# Patient Record
Sex: MALE | Race: WHITE | Employment: FULL TIME | URBAN - METROPOLITAN AREA
[De-identification: names, ages, dates, MRNs, and addresses within clinical notes are randomized per-mention and may not be internally consistent; named-entity substitution may affect disease eponyms.]

---

## 2017-08-13 DIAGNOSIS — R06.09 DYSPNEA ON EXERTION: Primary | ICD-10-CM

## 2017-08-13 RX ORDER — ALBUTEROL SULFATE 90 UG/1
1 AEROSOL, METERED RESPIRATORY (INHALATION)
Qty: 1 INHALER | Refills: 4 | Status: SHIPPED | OUTPATIENT
Start: 2017-08-13 | End: 2020-03-11

## 2018-01-12 ENCOUNTER — TELEPHONE (OUTPATIENT)
Dept: INTERNAL MEDICINE CLINIC | Age: 53
End: 2018-01-12

## 2018-01-12 RX ORDER — BENZONATATE 200 MG/1
200 CAPSULE ORAL
Qty: 20 CAP | Refills: 0 | Status: SHIPPED | OUTPATIENT
Start: 2018-01-12 | End: 2018-01-19

## 2018-01-12 NOTE — TELEPHONE ENCOUNTER
texting pt. Mostly dry / rattling cough without other symptoms following sinus infection. No postnasal drip. Failing dayquil, nyquil. Will try tessalon next. New medication or Refill was escribed to patient's pharmacy as requested.

## 2018-08-13 ENCOUNTER — OFFICE VISIT (OUTPATIENT)
Dept: INTERNAL MEDICINE CLINIC | Age: 53
End: 2018-08-13

## 2018-08-13 VITALS
BODY MASS INDEX: 21.55 KG/M2 | HEART RATE: 50 BPM | TEMPERATURE: 97.5 F | DIASTOLIC BLOOD PRESSURE: 74 MMHG | WEIGHT: 159.13 LBS | SYSTOLIC BLOOD PRESSURE: 116 MMHG | OXYGEN SATURATION: 100 % | RESPIRATION RATE: 17 BRPM | HEIGHT: 72 IN

## 2018-08-13 DIAGNOSIS — G47.33 OSA (OBSTRUCTIVE SLEEP APNEA): ICD-10-CM

## 2018-08-13 DIAGNOSIS — Z23 ENCOUNTER FOR IMMUNIZATION: ICD-10-CM

## 2018-08-13 DIAGNOSIS — D22.9 MULTIPLE BENIGN NEVI: ICD-10-CM

## 2018-08-13 DIAGNOSIS — Z12.11 COLON CANCER SCREENING: ICD-10-CM

## 2018-08-13 DIAGNOSIS — Z00.00 PREVENTATIVE HEALTH CARE: Primary | ICD-10-CM

## 2018-08-13 NOTE — MR AVS SNAPSHOT
[address] [place] [address]  [phone]     Patient: Oneyda Saha Lower  MRN: [id number]  :1965               Visit Information     Date & Time Provider Department Dept. Phone Encounter #    2018  7:45 AM Vanessa Min MD [facility] [id number]      Follow-up Instructions     Return in about 1 year (around 2019).       Upcoming Health Maintenance        Date Due    COLONOSCOPY 1984    DTaP/Tdap/Td series (1 - Tdap) 2011    Influenza Age 9 to Adult 2018      Allergies as of 2018  Review Complete On: 2018 By: Vanessa Min MD    No Known Allergies      Current Immunizations  Reviewed on 2018    Name Date    Td 2018, 2011       Reviewed by Vanessa Min MD on 2018 at  8:12 AM    Reviewed by Vanessa Min MD on 2018 at  8:12 AM    Reviewed by Vanessa Min MD on 2018 at  8:14 AM   You Were Diagnosed With        Codes Comments    Preventative health care    -  Primary ICD-10-CM: Z00.00  ICD-9-CM: V70.0     TABATHA (obstructive sleep apnea)     ICD-10-CM: P22.96  ICD-9-CM: 327.23     Encounter for immunization     ICD-10-CM: Z23  ICD-9-CM: V03.89     Colon cancer screening     ICD-10-CM: Z12.11  ICD-9-CM: V76.51     Multiple benign nevi     ICD-10-CM: D22.9  ICD-9-CM: 216.9       Vitals     BP Pulse Temp Resp Height(growth percentile) Weight(growth percentile)    116/74 (BP 1 Location: Left arm, BP Patient Position: Sitting) (!) 50 97.5 °F (36.4 °C) (Oral) 17 6' (1.829 m) 159 lb 2 oz (72.2 kg)    SpO2 BMI Smoking Status             100% 21.58 kg/m2 Never Smoker       Vitals History      BMI and BSA Data     Body Mass Index Body Surface Area    21.58 kg/m 2 1.92 m 2         Preferred Pharmacy       Pharmacy Name Phone    [id number] Shivani Nava, [address] [phone]         Your Updated Medication List          This list is accurate as of 18  8:24 AM.  Always use your most recent med list.                albuterol 90 mcg/actuation inhaler   Commonly known as:  PROVENTIL HFA, VENTOLIN HFA, PROAIR HFA   Take 1 Puff by inhalation every four (4) hours as needed for Wheezing. cetirizine 10 mg tablet   Commonly known as:  ZYRTEC   Take 1 tablet by mouth daily. varicella-zoster recombinant (PF) 50 mcg/0.5 mL Susr injection   Commonly known as:  SHINGRIX (PF)   0.5 mL by IntraMUSCular route once for 1 dose. Prescriptions Printed        Refills    varicella-zoster recombinant, PF, (SHINGRIX, PF,) 50 mcg/0.5 mL susr injection 0    Si.5 mL by IntraMUSCular route once for 1 dose. Class: Print    Route: IntraMUSCular      We Performed the Following     LIPID PANEL [82612 CPT(R)]     METABOLIC PANEL, BASIC [65595 CPT(R)]     PSA, DIAGNOSTIC (PROSTATE SPECIFIC AG) [91763 CPT(R)]     REFERRAL TO DERMATOLOGY [REF19 Custom]     REFERRAL TO GASTROENTEROLOGY [TWJ54 Custom]       Follow-up Instructions     Return in about 1 year (around 2019). Referral Information     Referral ID Referred By Referred To       [id number] [address], [address] [place] [address]         Visits Status Start Date End Date    1 New Request 18    If your referral has a status of pending review or denied, additional information will be sent to support the outcome of this decision. Referral ID Referred By Referred To    [id number] Renetta Pack NP      [facility], [address]      Phone: [phone]      Fax: [phone]    Visits Status Start Date End Date    1 New Request 18    If your referral has a status of pending review or denied, additional information will be sent to support the outcome of this decision.       Patient Instructions         Well Visit, Men 48 to 72: Care Instructions  Your Care Instructions    Physical exams can help you stay healthy. Your doctor has checked your overall health and may have suggested ways to take good care of yourself. He or she also may have recommended tests. At home, you can help prevent illness with healthy eating, regular exercise, and other steps. Follow-up care is a key part of your treatment and safety. Be sure to make and go to all appointments, and call your doctor if you are having problems. It's also a good idea to know your test results and keep a list of the medicines you take. How can you care for yourself at home? · Reach and stay at a healthy weight. This will lower your risk for many problems, such as obesity, diabetes, heart disease, and high blood pressure. · Get at least 30 minutes of exercise on most days of the week. Walking is a good choice. You also may want to do other activities, such as running, swimming, cycling, or playing tennis or team sports. · Do not smoke. Smoking can make health problems worse. If you need help quitting, talk to your doctor about stop-smoking programs and medicines. These can increase your chances of quitting for good. · Protect your skin from too much sun. When you're outdoors from 10 a.m. to 4 p.m., stay in the shade or cover up with clothing and a hat with a wide brim. Wear sunglasses that block UV rays. Even when it's cloudy, put broad-spectrum sunscreen (SPF 30 or higher) on any exposed skin. · See a dentist one or two times a year for checkups and to have your teeth cleaned. · Wear a seat belt in the car. · Limit alcohol to 2 drinks a day. Too much alcohol can cause health problems. Follow your doctor's advice about when to have certain tests. These tests can spot problems early. · Cholesterol.  Your doctor will tell you how often to have this done based on your overall health and other things that can increase your risk for heart attack and stroke. · Blood pressure. Have your blood pressure checked during a routine doctor visit. Your doctor will tell you how often to check your blood pressure based on your age, your blood pressure results, and other factors. · Prostate exam. Talk to your doctor about whether you should have a blood test (called a PSA test) for prostate cancer. Experts disagree on whether men should have this test. Some experts recommend that you discuss the benefits and risks of the test with your doctor. · Diabetes. Ask your doctor whether you should have tests for diabetes. · Vision. Some experts recommend that you have yearly exams for glaucoma and other age-related eye problems starting at age 48. · Hearing. Tell your doctor if you notice any change in your hearing. You can have tests to find out how well you hear. · Colon cancer. You should begin tests for colon cancer at age 48. You may have one of several tests. Your doctor will tell you how often to have tests based on your age and risk. Risks include whether you already had a precancerous polyp removed from your colon or whether your parent, brother, sister, or child has had colon cancer. · Heart attack and stroke risk. At least every 4 to 6 years, you should have your risk for heart attack and stroke assessed. Your doctor uses factors such as your age, blood pressure, cholesterol, and whether you smoke or have diabetes to show what your risk for a heart attack or stroke is over the next 10 years. · Abdominal aortic aneurysm. Ask your doctor whether you should have a test to check for an aneurysm. You may need a test if you ever smoked or if your parent, brother, sister, or child has had an aneurysm. When should you call for help? Watch closely for changes in your health, and be sure to contact your doctor if you have any problems or symptoms that concern you. Where can you learn more? Go to http://phylicia-maryana.info/.   Enter N829 in the search box to learn more about \"Well Visit, Men 48 to 72: Care Instructions. \"  Current as of: Evi 10, 2017  Content Version: 11.7  © 2159-6426 [employer]. Care instructions adapted under license by [employer] (which disclaims liability or warranty for this information). If you have questions about a medical condition or this instruction, always ask your healthcare professional. [address] any warranty or liability for your use of this information. Introducing [facility]! Dear Temi Miss: Thank you for requesting a [employer] account. Our records indicate that you already have an active [employer] account. You can access your account anytime at [email or web address]     Did you know that you can access your hospital and ER discharge instructions at any time in [employer]? You can also review all of your test results from your hospital stay or ER visit. Additional Information    If you have questions, please visit the Frequently Asked Questions section of the [employer] website at [email or web address] Remember, [employer] is NOT to be used for urgent needs. For medical emergencies, dial 911. Now available from your iPhone and Android! Please provide this summary of care documentation to your next provider. Your primary care clinician is listed as Uli Casas. If you have any questions after today's visit, please call [phone].

## 2018-08-13 NOTE — PROGRESS NOTES
HISTORY OF PRESENT ILLNESS  Hao Hu is a 47 y.o. male. HPI  Hao Hu is here for complete health maintenance physical exam and screening. he does not have other concerns. Health maintenance hx includes:  Exercise: very active. Form of exercise: running, home repair   Diet: generally follows a low fat low cholesterol diet, exercises regularly    Cancer screening:    Colon cancer screening:  Last Colonoscopy: never   Prostate cancer screening: PSA and/or LATRICE:  Never    No results found for: PSA, Adali Paz, [id number], [id number], PSALT       Lab Results   Component Value Date/Time    Cholesterol, Total 223 (H) 01/16/2015 08:29 AM       Lab Results   Component Value Date/Time    Glucose 91 01/16/2015 08:29 AM         Immunizations:     Immunization History   Administered Date(s) Administered    Td 01/05/2011, 06/13/2018      Immunization status: due today. Social History     Social History    Marital status:      Spouse name: N/A    Number of children: N/A    Years of education: N/A     Occupational History    Not on file.      Social History Main Topics    Smoking status: Never Smoker    Smokeless tobacco: Never Used    Alcohol use 1.0 - 1.5 oz/week     2 - 3 Standard drinks or equivalent per week    Drug use: No    Sexual activity: Yes     Other Topics Concern    Not on file     Social History Narrative     Past Surgical History:   Procedure Laterality Date    HX VASECTOMY       Family History   Problem Relation Age of Onset    Cancer Father      lung    Heart Disease Paternal Grandfather     Elevated Lipids Mother     Diabetes Maternal Grandmother     Diabetes Maternal Grandfather     Alzheimer Maternal Grandfather     Cancer Daughter      neuroblastoma     Current Outpatient Prescriptions on File Prior to Visit   Medication Sig Dispense Refill    albuterol (PROVENTIL HFA, VENTOLIN HFA, PROAIR HFA) 90 mcg/actuation inhaler Take 1 Puff by inhalation every four (4) hours as needed for Wheezing. 1 Inhaler 4    cetirizine (ZYRTEC) 10 mg tablet Take 1 tablet by mouth daily. No current facility-administered medications on file prior to visit. .    Review of Systems   Constitutional: Negative for malaise/fatigue and weight loss. HENT: Negative for sore throat. Eyes: Negative for blurred vision and pain. Respiratory: Negative for cough, shortness of breath and wheezing. Cardiovascular: Negative for chest pain, palpitations and leg swelling. Gastrointestinal: Negative for constipation, diarrhea, heartburn, nausea and vomiting. Genitourinary: Negative for dysuria and hematuria. Musculoskeletal: Negative for back pain, joint pain and myalgias. Skin: Negative for rash. Neurological: Negative for dizziness and headaches. Psychiatric/Behavioral: Negative for depression. The patient is not nervous/anxious. Physical Exam   Constitutional: He is oriented to person, place, and time. He appears well-developed and well-nourished. No distress. Body mass index is 21.58 kg/(m^2). /74 (BP 1 Location: Left arm, BP Patient Position: Sitting)  Pulse (!) 50  Temp 97.5 °F (36.4 °C) (Oral)   Resp 17  Ht 6' (1.829 m)  Wt 159 lb 2 oz (72.2 kg)  SpO2 100%  BMI 21.58 kg/m2   HENT:   Head: Normocephalic and atraumatic. Right Ear: Hearing, tympanic membrane and ear canal normal.   Left Ear: Hearing, tympanic membrane and ear canal normal.   Nose: Nose normal.   Mouth/Throat: Oropharynx is clear and moist and mucous membranes are normal. Normal dentition. Eyes: Conjunctivae and lids are normal. Pupils are equal, round, and reactive to light. Right eye exhibits no discharge. Left eye exhibits no discharge. No scleral icterus. Neck: Trachea normal. No thyromegaly present. Cardiovascular: Normal rate, regular rhythm, normal heart sounds, intact distal pulses and normal pulses. Exam reveals no gallop and no friction rub.     No murmur heard. Pulmonary/Chest: Effort normal and breath sounds normal. No respiratory distress. Abdominal: Soft. Normal appearance and bowel sounds are normal. He exhibits no distension and no mass. There is no hepatosplenomegaly. There is no tenderness. There is no CVA tenderness. Musculoskeletal: Normal range of motion. He exhibits no edema or tenderness. Lymphadenopathy:     He has no cervical adenopathy. Right: No inguinal adenopathy present. Left: No inguinal adenopathy present. Neurological: He is alert and oriented to person, place, and time. Skin: Skin is warm and dry. No rash noted. He is not diaphoretic. Scattered freckling and small to moderate sized homogenous nevi over trunk, face, UE's. .  None appear suspicious   Psychiatric: He has a normal mood and affect. His speech is normal and behavior is normal. Judgment and thought content normal. Cognition and memory are normal.   Nursing note and vitals reviewed. ASSESSMENT and PLAN  Diagnoses and all orders for this visit:    1. Preventative health care  -     LIPID PANEL  -     METABOLIC PANEL, [address]  he was advised to have follow up colonoscopy in 2018  [facility] counseled on age-appropriate/ guideline-based risk prevention behaviors and screening for a 47y.o. year old   male . We also discussed adjustments in screening based on family history if necessary. Printed instructions for preventative screening guidelines and healthy behaviors given to patient with after visit summary. 2. TABATHA (obstructive sleep apnea) - mild symptoms. We discussed importance of considering relook at treatment. Consider following up with sleep specialist.  He has hx of retinal hemorrhage so addressing obstructive sleep apnea, lipids, screening for early DM, HTN all important.     3. Encounter for immunization  -     varicella-zoster recombinant, PF, (SHINGRIX, PF,) 50 mcg/0.5 mL susr injection; 0.5 mL by IntraMUSCular route once for 1 dose. 4. Colon cancer screening  -     [facility]    5. Multiple benign nevi  -     REFERRAL TO DERMATOLOGY      Follow-up Disposition:  Return in about 1 year (around 8/13/2019).

## 2018-08-13 NOTE — PATIENT INSTRUCTIONS

## 2018-08-14 LAB
BUN SERPL-MCNC: 13 MG/DL (ref 6–24)
BUN/CREAT SERPL: 13 (ref 9–20)
CALCIUM SERPL-MCNC: 9.4 MG/DL (ref 8.7–10.2)
CHLORIDE SERPL-SCNC: 101 MMOL/L (ref 96–106)
CHOLEST SERPL-MCNC: 236 MG/DL (ref 100–199)
CO2 SERPL-SCNC: 23 MMOL/L (ref 20–29)
CREAT SERPL-MCNC: 1.03 MG/DL (ref 0.76–1.27)
GLUCOSE SERPL-MCNC: 97 MG/DL (ref 65–99)
HDLC SERPL-MCNC: 66 MG/DL
INTERPRETATION, 910389: NORMAL
LDLC SERPL CALC-MCNC: 153 MG/DL (ref 0–99)
POTASSIUM SERPL-SCNC: 4.6 MMOL/L (ref 3.5–5.2)
PSA SERPL-MCNC: 7.5 NG/ML (ref 0–4)
SODIUM SERPL-SCNC: 140 MMOL/L (ref 134–144)
TRIGL SERPL-MCNC: 87 MG/DL (ref 0–149)
VLDLC SERPL CALC-MCNC: 17 MG/DL (ref 5–40)

## 2018-08-15 ENCOUNTER — TELEPHONE (OUTPATIENT)
Dept: INTERNAL MEDICINE CLINIC | Age: 53
End: 2018-08-15

## 2018-08-15 DIAGNOSIS — R97.20 ELEVATED PSA, LESS THAN 10 NG/ML: Primary | ICD-10-CM

## 2018-09-05 DIAGNOSIS — R97.20 ELEVATED PSA, LESS THAN 10 NG/ML: ICD-10-CM

## 2018-09-20 ENCOUNTER — OFFICE VISIT (OUTPATIENT)
Dept: DERMATOLOGY | Facility: AMBULATORY SURGERY CENTER | Age: 53
End: 2018-09-20

## 2018-09-20 VITALS
BODY MASS INDEX: 21.54 KG/M2 | DIASTOLIC BLOOD PRESSURE: 60 MMHG | OXYGEN SATURATION: 97 % | TEMPERATURE: 97.8 F | HEIGHT: 72 IN | SYSTOLIC BLOOD PRESSURE: 110 MMHG | WEIGHT: 159 LBS | RESPIRATION RATE: 14 BRPM | HEART RATE: 58 BPM

## 2018-09-20 DIAGNOSIS — L81.4 LENTIGINES: ICD-10-CM

## 2018-09-20 DIAGNOSIS — Z12.83 SCREENING FOR MALIGNANT NEOPLASM OF SKIN: ICD-10-CM

## 2018-09-20 DIAGNOSIS — D22.9 MULTIPLE BENIGN NEVI: Primary | ICD-10-CM

## 2018-09-20 DIAGNOSIS — L57.3 POIKILODERMA OF CIVATTE: ICD-10-CM

## 2018-09-20 DIAGNOSIS — L82.1 SEBORRHEIC KERATOSES: ICD-10-CM

## 2018-09-20 DIAGNOSIS — D18.01 CHERRY ANGIOMA: ICD-10-CM

## 2018-09-20 DIAGNOSIS — L73.8 SEBACEOUS HYPERPLASIA OF FACE: ICD-10-CM

## 2018-09-20 NOTE — PROGRESS NOTES
Written by Lissa Frausto, as dictated by Kortney Coburn, [address]. Name: Swapna Singh       Age: 47 y.o. Date: 9/20/2018    Chief Complaint:   Chief Complaint   Patient presents with    Skin Exam     new patient-full skin exam       Subjective:    HPI  Mr. Swapna Singh is a 47 y.o. male who presents as a new patient to [facility] for a skin exam.  The patient was referred for this evaluation by Dr. Benito Street. The patient has not had a skin exam in the past and the patient does not have current complaints related to his skin. He states he has many nevi and Dr. Benito Street would like to have these evaluated. He repots frequent sun exposure through running and yard work. He reports no personal or family history of skin cancer, however he does report his daughter has a lesion removed from her scalp. The patient's pertinent skin history includes: No personal or family history of skin cancer. Frequent sun exposure with some sun protection. ROS: Constitutional: Negative. Dermatological : negative    Social History     Social History    Marital status:      Spouse name: N/A    Number of children: N/A    Years of education: N/A     Occupational History    Not on file.      Social History Main Topics    Smoking status: Never Smoker    Smokeless tobacco: Never Used    Alcohol use 1.0 - 1.5 oz/week     2 - 3 Standard drinks or equivalent per week    Drug use: No    Sexual activity: Yes     Other Topics Concern    Not on file     Social History Narrative       Family History   Problem Relation Age of Onset    Cancer Father      lung    Heart Disease Paternal Grandfather     Elevated Lipids Mother     Diabetes Maternal Grandmother     Diabetes Maternal Grandfather     Alzheimer Maternal Grandfather     Cancer Daughter      neuroblastoma       Past Medical History:   Diagnosis Date    Asthma     as child    Contracture of hand joint     Dupytens Contracture of both hands    Sun-damaged skin        Past Surgical History:   Procedure Laterality Date    HX VASECTOMY         Current Outpatient Prescriptions   Medication Sig Dispense Refill    cetirizine (ZYRTEC) 10 mg tablet Take 1 tablet by mouth daily.  albuterol (PROVENTIL HFA, VENTOLIN HFA, PROAIR HFA) 90 mcg/actuation inhaler Take 1 Puff by inhalation every four (4) hours as needed for Wheezing. 1 Inhaler 4       No Known Allergies      Objective:    Visit Vitals    /60 (BP 1 Location: Right arm, BP Patient Position: Sitting)    Pulse (!) 58    Temp 97.8 °F (36.6 °C) (Oral)    Resp 14    Ht 6' (1.829 m)    Wt 159 lb (72.1 kg)    SpO2 97%    BMI 21.56 kg/m2       Hao EDDY Lower is a 47 y.o. male who appears well and in no distress. He is awake, alert, and oriented. There is no preauricular, submandibular, or cervical lymphadenopathy. A skin examination was performed including his scalp, face (including eyelid), ears, neck, chest, back, abdomen, upper extremities (including digits/nails), lower extremities, breasts. He has many pink intradermal nevi and brown junctional nevi,- some medium brown and light tan nevi as well, no concerning features for severe atypia. Some of his nevi are greater than 5 mm and with irregular borders but represent hallmark nevi that appear long standing, no evidence of severe atypia. There are lentigines on sun exposed areas. He has scattered red and purple papules consistent with cherry angiomas. He has evidence of sun damage on his neck lateral neck - vascular and pigment macular skin change- consistent with poikiloderma of civatte. He has scattered waxy macules and keratotic papules consistent with seborrheic keratoses. Assessment/Plan:    1. Normal nevi. The diagnosis of normal nevi was reviewed.   I discussed sun protection, sunscreen use, the warning signs of skin cancer, the need for self-skin examinations, and the need for regular practitioner exams every 2-3 years. The patient should follow up sooner as needed if new, changing, or symptomatic skin lesions arise. 2. Solar lentigos. The diagnosis and relationship to sun exposure was reviewed. Sun protection advised. Discussed sun screen, hats and UPF clothing. 3. Cherry angiomas. The diagnosis was reviewed and the patient was reassured that no treatment is needed for these benign lesions. 4. Sebaceous Hyperplasia. The diagnosis was discussed as well as the benign nature of this condition. The patient was reassured that no treatment is needed at this time. 5. Poikiloderma of [place]. The diagnosis was reviewed. Heloise Capes protection was advised. I recommended sunscreen that contain zinc and titanium. 6. Seborrheic keratoses. The diagnosis was reviewed and the patient was reassured that no treatment is needed for these benign lesions. This plan was reviewed with the patient and patient agrees. All questions were answered. This scribe documentation was reviewed by me and accurately reflects the examination and decisions made by me.

## 2018-09-20 NOTE — MR AVS SNAPSHOT
[address]  [address]  [address]  [phone]     Patient: Shala Hu  MRN: [id number]  :1965               Visit Information     Date & Time Provider Department Dept. Phone Encounter #    2018  2:30 PM KIARA Arredondo [id number] [phone] [id number]      Upcoming Health Maintenance        Date Due    COLONOSCOPY 1984    DTaP/Tdap/Td series (1 - Tdap) 2018    Influenza Age 9 to Adult 2018      Allergies as of 2018  Review Complete On: 2018 By: Steven Eisenmenger    No Known Allergies      Current Immunizations  Reviewed on 2018    Name Date    Td 2018, 2011       Not reviewed this visit   Vitals     BP Pulse Temp Resp Height(growth percentile) Weight(growth percentile)    110/60 (BP 1 Location: Right arm, BP Patient Position: Sitting) (!) 58 97.8 °F (36.6 °C) (Oral) 14 6' (1.829 m) 159 lb (72.1 kg)    SpO2 BMI Smoking Status             97% 21.56 kg/m2 Never Smoker       Vitals History      BMI and BSA Data     Body Mass Index Body Surface Area    21.56 kg/m 2 1.91 m 2         Preferred Pharmacy       Pharmacy Name Phone    [id number] [address] [phone]         Your Updated Medication List          This list is accurate as of 18  3:31 PM.  Always use your most recent med list.                albuterol 90 mcg/actuation inhaler   Commonly known as:  PROVENTIL HFA, VENTOLIN HFA, PROAIR HFA   Take 1 Puff by inhalation every four (4) hours as needed for Wheezing. cetirizine 10 mg tablet   Commonly known as:  ZYRTEC   Take 1 tablet by mouth daily. Patient Instructions    Self Skin Exam and Sunscreens    Early detection and treatment is essential in the treatment of all forms of skin cancer. If caught early, all forms of skin cancer are curable.   In addition to your regular visits, you should perform a monthly skin examination. Over time, you become familiar with what is normally found on your skin and can identify new or suspicious spots. One of the screening tools you can use to assess your skin is to follow the ABCDEs:    A= Asymmetry (One half is unlike the other half)     B= Border (An irregular, scalloped or poorly defined edge)    C= Color (Is varied from one area to another, has shades of tan, brown/ black,       white, red or blue)    D= Diameter (Spots larger than 6mm or a pencil eraser)    E= Evolving (New spots or one that is changing in size, shape, or color)    A follow- up interval will be customized based on your history of skin cancer or level of skin damage and risk factors. In any case, if you notice a suspicious or new spot, an appointment should be arranged between regular visits. Everyone should use sunscreen and sun-safe practices, which is especially important for those with a personal or family history of skin cancer. Suggestions for this include:    1. Use daily moisturizers containing SPF 30 or higher. 2. Wear long sleeve clothing with UPF ratings and a broad-brimmed hat. 3. Apply sunscreen with SPF 30 or higher to all sun exposed areas if you are going to be in the sun. A broad spectrum UVA/ UVB sunscreen is best.  Dont forget to REAPPLY every two hours or more often if swimming or sweating! 4. Avoid outside activities during peak sun hours, especially in the summer (10am- 2pm). 5. DO NOT use tanning beds. Using sunscreen and sun-safe practices can help reduce the likelihood of developing skin cancer or additional skin cancers in those previously diagnosed. Introducing [facility]! Keiry Batista: Thank you for requesting a Mobisc[employer] account. Our records indicate that you already have an active Mobiscope account. You can access your account anytime at [email or web address]     Did you know that you can access your hospital and ER discharge instructions at any time in [employer] [facility]? You can also review all of your test results from your hospital stay or ER visit. Additional Information    If you have questions, please visit the Frequently Asked Questions section of the Blue[employer]ap website at [email or web address] Remember, BlueSnap is NOT to be used for urgent needs. For medical emergencies, dial 911. Now available from your iPhone and Android! Please provide this summary of care documentation to your next provider. Your primary care clinician is listed as Lizett Alcaraz. If you have any questions after today's visit, please call [phone].

## 2018-11-08 LAB
PSA FREE MFR SERPL: 9.8 %
PSA FREE SERPL-MCNC: 0.63 NG/ML
PSA SERPL-MCNC: 6.4 NG/ML (ref 0–4)
REFLEX CRITERIA: ABNORMAL

## 2018-11-08 NOTE — PROGRESS NOTES
I spoke to pt. PSA still high with low free PSA. He needs further work up with urology, ? Bx. Referred to Dr. Zeeshan Noonan in [facility]. Pt will schedule appt. Fax info to Dr. Zeeshan Noonan.

## 2020-03-10 ENCOUNTER — HOSPITAL ENCOUNTER (INPATIENT)
Age: 55
LOS: 2 days | Discharge: HOME | DRG: 372 | End: 2020-03-13
Attending: EMERGENCY MEDICINE | Admitting: HOSPITALIST
Payer: COMMERCIAL

## 2020-03-10 ENCOUNTER — APPOINTMENT (OUTPATIENT)
Dept: GENERAL RADIOLOGY | Age: 55
DRG: 372 | End: 2020-03-10
Attending: EMERGENCY MEDICINE
Payer: COMMERCIAL

## 2020-03-10 ENCOUNTER — APPOINTMENT (OUTPATIENT)
Dept: CT IMAGING | Age: 55
DRG: 372 | End: 2020-03-10
Attending: EMERGENCY MEDICINE
Payer: COMMERCIAL

## 2020-03-10 DIAGNOSIS — R55 SYNCOPE AND COLLAPSE: ICD-10-CM

## 2020-03-10 DIAGNOSIS — E86.1 HYPOTENSION DUE TO HYPOVOLEMIA: ICD-10-CM

## 2020-03-10 DIAGNOSIS — E86.0 DEHYDRATION: ICD-10-CM

## 2020-03-10 DIAGNOSIS — S01.01XA LACERATION OF SCALP, INITIAL ENCOUNTER: ICD-10-CM

## 2020-03-10 DIAGNOSIS — K52.9 COLITIS: Primary | ICD-10-CM

## 2020-03-10 DIAGNOSIS — R19.7 DIARRHEA, UNSPECIFIED TYPE: ICD-10-CM

## 2020-03-10 DIAGNOSIS — I95.89 HYPOTENSION DUE TO HYPOVOLEMIA: ICD-10-CM

## 2020-03-10 PROCEDURE — 75810000293 HC SIMP/SUPERF WND  RPR

## 2020-03-10 PROCEDURE — 70450 CT HEAD/BRAIN W/O DYE: CPT

## 2020-03-10 PROCEDURE — 80053 COMPREHEN METABOLIC PANEL: CPT

## 2020-03-10 PROCEDURE — 74011250636 HC RX REV CODE- 250/636: Performed by: EMERGENCY MEDICINE

## 2020-03-10 PROCEDURE — 85025 COMPLETE CBC W/AUTO DIFF WBC: CPT

## 2020-03-10 PROCEDURE — 94762 N-INVAS EAR/PLS OXIMTRY CONT: CPT

## 2020-03-10 PROCEDURE — 96361 HYDRATE IV INFUSION ADD-ON: CPT

## 2020-03-10 PROCEDURE — 0HQ0XZZ REPAIR SCALP SKIN, EXTERNAL APPROACH: ICD-10-PCS | Performed by: EMERGENCY MEDICINE

## 2020-03-10 PROCEDURE — 71046 X-RAY EXAM CHEST 2 VIEWS: CPT

## 2020-03-10 PROCEDURE — 36415 COLL VENOUS BLD VENIPUNCTURE: CPT

## 2020-03-10 PROCEDURE — 99285 EMERGENCY DEPT VISIT HI MDM: CPT

## 2020-03-10 RX ADMIN — SODIUM CHLORIDE 1000 ML: 900 INJECTION, SOLUTION INTRAVENOUS at 23:39

## 2020-03-10 NOTE — LETTER
[address]  [facility] EMERGENCY DEPT  [address] [place] [id number] [phone]    Work/School Note    Date: 3/10/2020    To Whom It May concern:    Hao Hu was seen and treated today in the emergency room by the following provider(s):  Attending Provider: Huey Govea Sosakelseadarshan may return to work on 3/13/20.     Sincerely,          Praveen Riley MD

## 2020-03-11 ENCOUNTER — APPOINTMENT (OUTPATIENT)
Dept: CT IMAGING | Age: 55
DRG: 372 | End: 2020-03-11
Attending: EMERGENCY MEDICINE
Payer: COMMERCIAL

## 2020-03-11 PROBLEM — S01.01XA SCALP LACERATION: Status: ACTIVE | Noted: 2020-03-11

## 2020-03-11 PROBLEM — K52.9 COLITIS: Status: ACTIVE | Noted: 2020-03-11

## 2020-03-11 PROBLEM — I95.9 HYPOTENSION: Status: ACTIVE | Noted: 2020-03-11

## 2020-03-11 PROBLEM — N17.9 AKI (ACUTE KIDNEY INJURY) (HCC): Status: ACTIVE | Noted: 2020-03-11

## 2020-03-11 PROBLEM — R55 SYNCOPE: Status: ACTIVE | Noted: 2020-03-11

## 2020-03-11 LAB
ALBUMIN SERPL-MCNC: 2.8 G/DL (ref 3.5–5)
ALBUMIN SERPL-MCNC: 3.8 G/DL (ref 3.5–5)
ALBUMIN/GLOB SERPL: 1.2 {RATIO} (ref 1.1–2.2)
ALBUMIN/GLOB SERPL: 1.2 {RATIO} (ref 1.1–2.2)
ALP SERPL-CCNC: 30 U/L (ref 45–117)
ALP SERPL-CCNC: 43 U/L (ref 45–117)
ALT SERPL-CCNC: 25 U/L (ref 12–78)
ALT SERPL-CCNC: 31 U/L (ref 12–78)
ANION GAP SERPL CALC-SCNC: 7 MMOL/L (ref 5–15)
ANION GAP SERPL CALC-SCNC: 8 MMOL/L (ref 5–15)
APPEARANCE UR: CLEAR
AST SERPL-CCNC: 19 U/L (ref 15–37)
AST SERPL-CCNC: 26 U/L (ref 15–37)
ATRIAL RATE: 93 BPM
BACTERIA URNS QL MICRO: NEGATIVE /HPF
BASOPHILS # BLD: 0 K/UL (ref 0–0.1)
BASOPHILS NFR BLD: 0 % (ref 0–1)
BILIRUB SERPL-MCNC: 1.5 MG/DL (ref 0.2–1)
BILIRUB SERPL-MCNC: 1.8 MG/DL (ref 0.2–1)
BILIRUB UR QL: NEGATIVE
BUN SERPL-MCNC: 10 MG/DL (ref 6–20)
BUN SERPL-MCNC: 13 MG/DL (ref 6–20)
BUN/CREAT SERPL: 9 (ref 12–20)
BUN/CREAT SERPL: 9 (ref 12–20)
CALCIUM SERPL-MCNC: 6.5 MG/DL (ref 8.5–10.1)
CALCIUM SERPL-MCNC: 8.5 MG/DL (ref 8.5–10.1)
CALCULATED P AXIS, ECG09: 76 DEGREES
CALCULATED R AXIS, ECG10: 115 DEGREES
CALCULATED T AXIS, ECG11: 41 DEGREES
CHLORIDE SERPL-SCNC: 104 MMOL/L (ref 97–108)
CHLORIDE SERPL-SCNC: 110 MMOL/L (ref 97–108)
CO2 SERPL-SCNC: 21 MMOL/L (ref 21–32)
CO2 SERPL-SCNC: 26 MMOL/L (ref 21–32)
COLOR UR: ABNORMAL
COMMENT, HOLDF: NORMAL
CREAT SERPL-MCNC: 1.15 MG/DL (ref 0.7–1.3)
CREAT SERPL-MCNC: 1.44 MG/DL (ref 0.7–1.3)
DIAGNOSIS, 93000: NORMAL
DIFFERENTIAL METHOD BLD: ABNORMAL
EOSINOPHIL # BLD: 0 K/UL (ref 0–0.4)
EOSINOPHIL NFR BLD: 0 % (ref 0–7)
EPITH CASTS URNS QL MICRO: ABNORMAL /LPF
ERYTHROCYTE [DISTWIDTH] IN BLOOD BY AUTOMATED COUNT: 11.9 % (ref 11.5–14.5)
GLOBULIN SER CALC-MCNC: 2.4 G/DL (ref 2–4)
GLOBULIN SER CALC-MCNC: 3.1 G/DL (ref 2–4)
GLUCOSE SERPL-MCNC: 134 MG/DL (ref 65–100)
GLUCOSE SERPL-MCNC: 145 MG/DL (ref 65–100)
GLUCOSE UR STRIP.AUTO-MCNC: NEGATIVE MG/DL
HCT VFR BLD AUTO: 41.2 % (ref 36.6–50.3)
HEMOCCULT STL QL: POSITIVE
HGB BLD-MCNC: 12.1 G/DL (ref 12.1–17)
HGB BLD-MCNC: 15 G/DL (ref 12.1–17)
HGB UR QL STRIP: ABNORMAL
HYALINE CASTS URNS QL MICRO: ABNORMAL /LPF (ref 0–5)
IMM GRANULOCYTES # BLD AUTO: 0 K/UL (ref 0–0.04)
IMM GRANULOCYTES NFR BLD AUTO: 0 % (ref 0–0.5)
KETONES UR QL STRIP.AUTO: ABNORMAL MG/DL
LACTATE SERPL-SCNC: 1.9 MMOL/L (ref 0.4–2)
LEUKOCYTE ESTERASE UR QL STRIP.AUTO: NEGATIVE
LYMPHOCYTES # BLD: 0.3 K/UL (ref 0.8–3.5)
LYMPHOCYTES NFR BLD: 4 % (ref 12–49)
MCH RBC QN AUTO: 33.3 PG (ref 26–34)
MCHC RBC AUTO-ENTMCNC: 36.4 G/DL (ref 30–36.5)
MCV RBC AUTO: 91.4 FL (ref 80–99)
MONOCYTES # BLD: 0.2 K/UL (ref 0–1)
MONOCYTES NFR BLD: 2 % (ref 5–13)
NEUTS SEG # BLD: 7 K/UL (ref 1.8–8)
NEUTS SEG NFR BLD: 94 % (ref 32–75)
NITRITE UR QL STRIP.AUTO: NEGATIVE
NRBC # BLD: 0 K/UL (ref 0–0.01)
NRBC BLD-RTO: 0 PER 100 WBC
P-R INTERVAL, ECG05: 164 MS
PH UR STRIP: 5.5 [PH] (ref 5–8)
PLATELET # BLD AUTO: 149 K/UL (ref 150–400)
PMV BLD AUTO: 9.5 FL (ref 8.9–12.9)
POTASSIUM SERPL-SCNC: 3.2 MMOL/L (ref 3.5–5.1)
POTASSIUM SERPL-SCNC: 3.6 MMOL/L (ref 3.5–5.1)
PROT SERPL-MCNC: 5.2 G/DL (ref 6.4–8.2)
PROT SERPL-MCNC: 6.9 G/DL (ref 6.4–8.2)
PROT UR STRIP-MCNC: NEGATIVE MG/DL
Q-T INTERVAL, ECG07: 368 MS
QRS DURATION, ECG06: 96 MS
QTC CALCULATION (BEZET), ECG08: 457 MS
RBC # BLD AUTO: 4.51 M/UL (ref 4.1–5.7)
RBC #/AREA URNS HPF: ABNORMAL /HPF (ref 0–5)
RBC MORPH BLD: ABNORMAL
SAMPLES BEING HELD,HOLD: NORMAL
SODIUM SERPL-SCNC: 137 MMOL/L (ref 136–145)
SODIUM SERPL-SCNC: 139 MMOL/L (ref 136–145)
SP GR UR REFRACTOMETRY: 1.02 (ref 1–1.03)
UA: UC IF INDICATED,UAUC: ABNORMAL
UROBILINOGEN UR QL STRIP.AUTO: 0.2 EU/DL (ref 0.2–1)
VENTRICULAR RATE, ECG03: 93 BPM
WBC # BLD AUTO: 7.5 K/UL (ref 4.1–11.1)
WBC URNS QL MICRO: ABNORMAL /HPF (ref 0–4)

## 2020-03-11 PROCEDURE — 74177 CT ABD & PELVIS W/CONTRAST: CPT

## 2020-03-11 PROCEDURE — 74011250637 HC RX REV CODE- 250/637: Performed by: EMERGENCY MEDICINE

## 2020-03-11 PROCEDURE — 74011000250 HC RX REV CODE- 250: Performed by: EMERGENCY MEDICINE

## 2020-03-11 PROCEDURE — 0107U C DIFF TOX AG DETCJ IA STOOL: CPT

## 2020-03-11 PROCEDURE — 93005 ELECTROCARDIOGRAM TRACING: CPT

## 2020-03-11 PROCEDURE — 83605 ASSAY OF LACTIC ACID: CPT

## 2020-03-11 PROCEDURE — 89055 LEUKOCYTE ASSESSMENT FECAL: CPT

## 2020-03-11 PROCEDURE — 82272 OCCULT BLD FECES 1-3 TESTS: CPT

## 2020-03-11 PROCEDURE — 96360 HYDRATION IV INFUSION INIT: CPT

## 2020-03-11 PROCEDURE — 74011250636 HC RX REV CODE- 250/636: Performed by: EMERGENCY MEDICINE

## 2020-03-11 PROCEDURE — 74011250637 HC RX REV CODE- 250/637: Performed by: HOSPITALIST

## 2020-03-11 PROCEDURE — 81001 URINALYSIS AUTO W/SCOPE: CPT

## 2020-03-11 PROCEDURE — 87177 OVA AND PARASITES SMEARS: CPT

## 2020-03-11 PROCEDURE — 96361 HYDRATE IV INFUSION ADD-ON: CPT

## 2020-03-11 PROCEDURE — 96374 THER/PROPH/DIAG INJ IV PUSH: CPT

## 2020-03-11 PROCEDURE — 74011636320 HC RX REV CODE- 636/320: Performed by: EMERGENCY MEDICINE

## 2020-03-11 PROCEDURE — 80053 COMPREHEN METABOLIC PANEL: CPT

## 2020-03-11 PROCEDURE — 87040 BLOOD CULTURE FOR BACTERIA: CPT

## 2020-03-11 PROCEDURE — 85018 HEMOGLOBIN: CPT

## 2020-03-11 PROCEDURE — 74011250636 HC RX REV CODE- 250/636: Performed by: HOSPITALIST

## 2020-03-11 PROCEDURE — 36415 COLL VENOUS BLD VENIPUNCTURE: CPT

## 2020-03-11 PROCEDURE — 65660000000 HC RM CCU STEPDOWN

## 2020-03-11 RX ORDER — ACETAMINOPHEN 325 MG/1
650 TABLET ORAL
Status: DISCONTINUED | OUTPATIENT
Start: 2020-03-11 | End: 2020-03-13 | Stop reason: HOSPADM

## 2020-03-11 RX ORDER — DIPHENOXYLATE HYDROCHLORIDE AND ATROPINE SULFATE 2.5; .025 MG/1; MG/1
1 TABLET ORAL
Qty: 20 TAB | Refills: 0 | Status: SHIPPED | OUTPATIENT
Start: 2020-03-11 | End: 2020-03-17

## 2020-03-11 RX ORDER — SODIUM CHLORIDE AND POTASSIUM CHLORIDE .9; .15 G/100ML; G/100ML
SOLUTION INTRAVENOUS CONTINUOUS
Status: DISCONTINUED | OUTPATIENT
Start: 2020-03-11 | End: 2020-03-13 | Stop reason: HOSPADM

## 2020-03-11 RX ORDER — SODIUM CHLORIDE 0.9 % (FLUSH) 0.9 %
10 SYRINGE (ML) INJECTION
Status: COMPLETED | OUTPATIENT
Start: 2020-03-11 | End: 2020-03-11

## 2020-03-11 RX ORDER — ONDANSETRON 2 MG/ML
4 INJECTION INTRAMUSCULAR; INTRAVENOUS
Status: DISCONTINUED | OUTPATIENT
Start: 2020-03-11 | End: 2020-03-11

## 2020-03-11 RX ORDER — ONDANSETRON 2 MG/ML
4 INJECTION INTRAMUSCULAR; INTRAVENOUS
Status: DISCONTINUED | OUTPATIENT
Start: 2020-03-11 | End: 2020-03-13 | Stop reason: HOSPADM

## 2020-03-11 RX ORDER — METRONIDAZOLE 500 MG/100ML
500 INJECTION, SOLUTION INTRAVENOUS EVERY 8 HOURS
Status: DISCONTINUED | OUTPATIENT
Start: 2020-03-11 | End: 2020-03-11

## 2020-03-11 RX ORDER — POTASSIUM CHLORIDE 750 MG/1
40 TABLET, FILM COATED, EXTENDED RELEASE ORAL
Status: COMPLETED | OUTPATIENT
Start: 2020-03-11 | End: 2020-03-11

## 2020-03-11 RX ORDER — METRONIDAZOLE 500 MG/100ML
500 INJECTION, SOLUTION INTRAVENOUS EVERY 12 HOURS
Status: DISCONTINUED | OUTPATIENT
Start: 2020-03-11 | End: 2020-03-13 | Stop reason: HOSPADM

## 2020-03-11 RX ORDER — ONDANSETRON 4 MG/1
4 TABLET, ORALLY DISINTEGRATING ORAL
Qty: 10 TAB | Refills: 0 | Status: SHIPPED | OUTPATIENT
Start: 2020-03-11 | End: 2020-03-17

## 2020-03-11 RX ORDER — SODIUM CHLORIDE 9 MG/ML
200 INJECTION, SOLUTION INTRAVENOUS CONTINUOUS
Status: DISCONTINUED | OUTPATIENT
Start: 2020-03-11 | End: 2020-03-11

## 2020-03-11 RX ORDER — DIPHENOXYLATE HYDROCHLORIDE AND ATROPINE SULFATE 2.5; .025 MG/1; MG/1
2 TABLET ORAL
Status: COMPLETED | OUTPATIENT
Start: 2020-03-11 | End: 2020-03-11

## 2020-03-11 RX ORDER — VANCOMYCIN HYDROCHLORIDE 250 MG/5ML
125 POWDER, FOR SOLUTION ORAL EVERY 6 HOURS
Status: DISCONTINUED | OUTPATIENT
Start: 2020-03-11 | End: 2020-03-13 | Stop reason: HOSPADM

## 2020-03-11 RX ORDER — BUTALBITAL, ACETAMINOPHEN AND CAFFEINE 50; 325; 40 MG/1; MG/1; MG/1
2 TABLET ORAL
Status: COMPLETED | OUTPATIENT
Start: 2020-03-11 | End: 2020-03-11

## 2020-03-11 RX ORDER — LEVOFLOXACIN 5 MG/ML
750 INJECTION, SOLUTION INTRAVENOUS EVERY 24 HOURS
Status: DISCONTINUED | OUTPATIENT
Start: 2020-03-11 | End: 2020-03-12

## 2020-03-11 RX ADMIN — VANCOMYCIN HYDROCHLORIDE 125 MG: KIT at 19:13

## 2020-03-11 RX ADMIN — IOPAMIDOL 100 ML: 755 INJECTION, SOLUTION INTRAVENOUS at 06:22

## 2020-03-11 RX ADMIN — BUTALBITAL, ACETAMINOPHEN, AND CAFFEINE 2 TABLET: 50; 325; 40 TABLET ORAL at 05:40

## 2020-03-11 RX ADMIN — SODIUM CHLORIDE 1000 ML: 900 INJECTION, SOLUTION INTRAVENOUS at 03:13

## 2020-03-11 RX ADMIN — VANCOMYCIN HYDROCHLORIDE 125 MG: KIT at 13:06

## 2020-03-11 RX ADMIN — ACETAMINOPHEN 650 MG: 325 TABLET ORAL at 17:34

## 2020-03-11 RX ADMIN — DIPHENOXYLATE HYDROCHLORIDE AND ATROPINE SULFATE 2 TABLET: 2.5; .025 TABLET ORAL at 00:41

## 2020-03-11 RX ADMIN — SODIUM CHLORIDE 1000 ML: 900 INJECTION, SOLUTION INTRAVENOUS at 05:43

## 2020-03-11 RX ADMIN — SODIUM CHLORIDE AND POTASSIUM CHLORIDE: 9; 1.49 INJECTION, SOLUTION INTRAVENOUS at 17:28

## 2020-03-11 RX ADMIN — SODIUM CHLORIDE AND POTASSIUM CHLORIDE: 9; 1.49 INJECTION, SOLUTION INTRAVENOUS at 23:00

## 2020-03-11 RX ADMIN — ACETAMINOPHEN 650 MG: 325 TABLET ORAL at 23:00

## 2020-03-11 RX ADMIN — LEVOFLOXACIN 750 MG: 5 INJECTION, SOLUTION INTRAVENOUS at 09:37

## 2020-03-11 RX ADMIN — METRONIDAZOLE 500 MG: 500 INJECTION, SOLUTION INTRAVENOUS at 08:24

## 2020-03-11 RX ADMIN — Medication 10 ML: at 06:22

## 2020-03-11 RX ADMIN — SODIUM CHLORIDE AND POTASSIUM CHLORIDE: 9; 1.49 INJECTION, SOLUTION INTRAVENOUS at 11:49

## 2020-03-11 RX ADMIN — SODIUM CHLORIDE 1000 ML: 900 INJECTION, SOLUTION INTRAVENOUS at 03:28

## 2020-03-11 RX ADMIN — METRONIDAZOLE 500 MG: 500 INJECTION, SOLUTION INTRAVENOUS at 20:09

## 2020-03-11 RX ADMIN — POTASSIUM CHLORIDE 40 MEQ: 750 TABLET, FILM COATED, EXTENDED RELEASE ORAL at 11:49

## 2020-03-11 RX ADMIN — SODIUM CHLORIDE 1000 ML: 900 INJECTION, SOLUTION INTRAVENOUS at 01:30

## 2020-03-11 RX ADMIN — Medication 5 ML: at 00:11

## 2020-03-11 NOTE — H&P
Hospitalist Admission Note    NAME: Sadi Hu   :  1965   MRN:  [id number]     Date/Time:  3/11/2020 10:35 AM    Patient PCP: none currently but scheduled to see Yomaira Plasencia DO on 2020  ______________________________________________________________________   Assessment & Plan:  Pancolitis with diarrhea, POA  Syncope due to hypotension  Scalp laceration, POA. 6 staples placed in the ER. Tetanus vaccine given a year ago  Hypotension from dehydration and colitis. Manual blood pressure obtained by myself in the right arm is 100/62. Patient's baseline blood pressure is 116/68  Mild PILLO creatinine of 1.44, baseline 1.0 2018  Hypokalemia due to diarrhea  Recent prostate biopsy a week ago    --Admit to stepdown. Given Levaquin and Flagyl for colitis. Will add vancomycin for C. difficile. Stool cultures, C. difficile, ova and parasite, stool WBC, stool Hemoccult blood has been ordered. --Consult GI. Wife request for gastrointestinal specialist as he was planning to see Dr. Neto Juares for colonoscopy  --Aggressive IV fluids with normal saline with potassium at 200 mL's per hour. If needed can start levophed. Manual blood pressure reading by myself in the right arm is 100/62  --Repeat BMP and CBC in a.m.  --remove scalp sutures in 1 week (3/18) from today. Body mass index is 22.31 kg/m². Code:  Full   DVT prophylaxis: SCDs  Surrogate decision maker: Wife        Subjective:   CHIEF COMPLAINT:      HISTORY OF PRESENT ILLNESS:     Brittney Christianson is a 49 y.o. male no significant past medical history who was well until he developed 2 episodes of diarrhea yesterday afternoon while at work. He went to urgent care last evening and tested negative for the flu and was discharged home. At 10:15 PM while in the bathroom he felt weak and passed out hitting the back of his head and came to the emergency room because he was bleeding.   CT of the head shows posterior soft tissue swelling. He had 2 episodes of diarrhea in the emergency room and had a syncopal episode while in the bathroom. He denies any chest pain shortness of breath or coughing. He had some abdominal pain and nausea but no vomiting. He denies seeing any blood in his stool. He denies any travel or sick contact with diarrheal illness. He had a prostate biopsy last Tuesday and took 3 days of Cipro and an a second antibiotic that started with a C (?cefuroxime). In the ER his initial blood pressure was 102/55 but since 2 AM his blood pressure has been low with SBP as low as 67/45-80 7/57. Has been given a total of 5 L of IV fluids and has voided approximately 500 mL's. He has never had a colonoscopy. Denies any fevers chills or weight loss. We were asked to admit for work up and evaluation of the above problems. Past Medical History:   Diagnosis Date    Asthma     as child    Contracture of hand joint     Dupytens Contracture of both hands    Sun-damaged skin       Past Surgical History:   Procedure Laterality Date    HX OTHER SURGICAL      bilateral Dupytren contracture repair    HX VASECTOMY       Social History     Tobacco Use    Smoking status: Never Smoker    Smokeless tobacco: Never Used   Substance Use Topics    Alcohol use: Yes     Alcohol/week: 1.7 - 2.5 standard drinks     Types: 2 - 3 Standard drinks or equivalent per week      Drug use:        Family History   Problem Relation Age of Onset    Cancer Father         lung    Heart Disease Paternal Grandfather     Elevated Lipids Mother     Diabetes Maternal Grandmother     Diabetes Maternal Grandfather     Alzheimer Maternal Grandfather     Cancer Daughter         neuroblastoma     No Known Allergies     Prior to Admission medications    Medication Sig Start Date End Date Taking?  Authorizing Provider   diphenoxylate-atropine (LOMOTIL) 2.5-0.025 mg per tablet Take 1 Tab by mouth four (4) times daily as needed for Diarrhea (1 tab after each stool for max 8 per day). Max Daily Amount: 4 Tabs. Take after each stool for a maximum of 8 tablets daily 3/11/20  Yes London Asencio MD   ondansetron [facility] ODT) 4 mg disintegrating tablet Take 1 Tab by mouth every eight (8) hours as needed for Nausea. 3/11/20  Yes London Asencio MD   cetirizine (ZYRTEC) 10 mg tablet Take 10 mg by mouth daily as needed. 1/5/15  Yes Dru Ayala MD     REVIEW OF SYSTEMS:  POSITIVE= Bold. Negative = normal text  General:  fever, chills, sweats, generalized weakness, weight loss/gain, loss of appetite  Eyes:  blurred vision, eye pain, loss of vision, diplopia  Ear Nose and Throat:  rhinorrhea, pharyngitis  Respiratory:   cough, sputum production, SOB, wheezing, GIL, pleuritic pain  Cardiology:  chest pain, palpitations, orthopnea, PND, edema, syncope   Gastrointestinal:  abdominal pain, N/V, dysphagia, diarrhea, constipation, bleeding  Genitourinary:  frequency, urgency, dysuria, hematuria, incontinence  Muskuloskeletal :  arthralgia, myalgia  Hematology:  easy bruising, bleeding, lymphadenopathy  Dermatological:  rash, ulceration, pruritis  Endocrine:  hot flashes or polydipsia  Neurological:  headache, dizziness, confusion, focal weakness, paresthesia, memory loss, gait disturbance  Psychological: anxiety, depression, agitation      Objective:   VITALS:    Visit Vitals  BP 99/59   Pulse 95   Temp 99.4 °F (37.4 °C)   Resp 16   Ht 6' (1.829 m)   Wt 74.6 kg (164 lb 7.4 oz)   SpO2 96%   BMI 22.31 kg/m²     Temp (24hrs), Av.6 °F (37.6 °C), Min:99.4 °F (37.4 °C), Max:99.7 °F (37.6 °C)    Body mass index is 22.31 kg/m². PHYSICAL EXAM:    General:    Alert, cooperative, no distress, appears stated age. HEENT: 6 staples in high posterior scalp with dried crusted blood, anicteric sclerae, pink conjunctivae     No oral ulcers, mucosa tacky and dry, throat clear. Hearing intact.   Neck:  Supple, symmetrical,  thyroid: non tender  Lungs:   Clear to auscultation bilaterally. No Wheezing or Rhonchi. No rales. Chest wall:  No tenderness  No Accessory muscle use. Heart:   Regular  rhythm,  No  murmur   No gallop. No edema. Abdomen:   Soft, non-tender. Not distended. Bowel sounds normal. No masses  Extremities: No cyanosis. No clubbing  Skin:     Not pale Not Jaundiced  No rashes   Psych:  Good insight. Not depressed. Not anxious or agitated. Neurologic: EOMs intact. No facial asymmetry. No aphasia or slurred speech. Symmetrical strength, Alert and oriented X 3. Peripheral pulse: Left, Radial, 2+  Capillary refill:  normal    IMAGING RESULTS:   []       I have personally reviewed the actual   []     CXR  []     CT scan  CXR:  CT :  EKG:   ________________________________________________________________________  Care Plan discussed with:    Comments   Patient y    Family  y    RN     Care Manager                    Consultant:      ________________________________________________________________________  Prophylaxis:  GI none   DVT SCD   ________________________________________________________________________  Recommended Disposition:   Home with Family y   HH/PT/OT/RN    SNF/LTC    NEO    ________________________________________________________________________  Code Status:  Full Code y   DNR/DNI    ________________________________________________________________________  TOTAL TIME:  55 minutes      ______________________________________________________________________  Chrystal Cisneros MD      Procedures: see electronic medical records for all procedures/Xrays and details which were not copied into this note but were reviewed prior to creation of Plan.     LAB DATA REVIEWED:    Recent Results (from the past 24 hour(s))   CBC WITH AUTOMATED DIFF    Collection Time: 03/10/20 11:35 PM   Result Value Ref Range    WBC 7.5 4.1 - 11.1 K/uL    RBC 4.51 4.10 - 5.70 M/uL    HGB 15.0 12.1 - 17.0 g/dL    HCT 41.2 36.6 - 50.3 %    MCV 91.4 80.0 - 99.0 FL    MCH 33.3 26.0 - 34.0 PG    MCHC 36.4 30.0 - 36.5 g/dL    RDW 11.9 11.5 - 14.5 %    PLATELET 663 (L) 500 - 400 K/uL    MPV 9.5 8.9 - 12.9 FL    NRBC 0.0 0  WBC    ABSOLUTE NRBC 0.00 0.00 - 0.01 K/uL    NEUTROPHILS 94 (H) 32 - 75 %    LYMPHOCYTES 4 (L) 12 - 49 %    MONOCYTES 2 (L) 5 - 13 %    EOSINOPHILS 0 0 - 7 %    BASOPHILS 0 0 - 1 %    IMMATURE GRANULOCYTES 0 0.0 - 0.5 %    ABS. NEUTROPHILS 7.0 1.8 - 8.0 K/UL    ABS. LYMPHOCYTES 0.3 (L) 0.8 - 3.5 K/UL    ABS. MONOCYTES 0.2 0.0 - 1.0 K/UL    ABS. EOSINOPHILS 0.0 0.0 - 0.4 K/UL    ABS. BASOPHILS 0.0 0.0 - 0.1 K/UL    ABS. IMM. GRANS. 0.0 0.00 - 0.04 K/UL    DF SMEAR SCANNED      RBC COMMENTS NORMOCYTIC, NORMOCHROMIC     METABOLIC PANEL, COMPREHENSIVE    Collection Time: 03/10/20 11:35 PM   Result Value Ref Range    Sodium 137 136 - 145 mmol/L    Potassium 3.2 (L) 3.5 - 5.1 mmol/L    Chloride 104 97 - 108 mmol/L    CO2 26 21 - 32 mmol/L    Anion gap 7 5 - 15 mmol/L    Glucose 145 (H) 65 - 100 mg/dL    BUN 13 6 - 20 MG/DL    Creatinine 1.44 (H) 0.70 - 1.30 MG/DL    BUN/Creatinine ratio 9 (L) 12 - 20      GFR est AA >60 >60 ml/min/1.73m2    GFR est non-AA 51 (L) >60 ml/min/1.73m2    Calcium 8.5 8.5 - 10.1 MG/DL    Bilirubin, total 1.8 (H) 0.2 - 1.0 MG/DL    ALT (SGPT) 31 12 - 78 U/L    AST (SGOT) 26 15 - 37 U/L    Alk.  phosphatase 43 (L) 45 - 117 U/L    Protein, total 6.9 6.4 - 8.2 g/dL    Albumin 3.8 3.5 - 5.0 g/dL    Globulin 3.1 2.0 - 4.0 g/dL    A-G Ratio 1.2 1.1 - 2.2     URINALYSIS W/ REFLEX CULTURE    Collection Time: 03/11/20  1:01 AM   Result Value Ref Range    Color DARK YELLOW      Appearance CLEAR CLEAR      Specific gravity 1.018 1.003 - 1.030      pH (UA) 5.5 5.0 - 8.0      Protein NEGATIVE  NEG mg/dL    Glucose NEGATIVE  NEG mg/dL    Ketone TRACE (A) NEG mg/dL    Bilirubin NEGATIVE  NEG      Blood MODERATE (A) NEG      Urobilinogen 0.2 0.2 - 1.0 EU/dL    Nitrites NEGATIVE  NEG      Leukocyte Esterase NEGATIVE  NEG      WBC 0-4 0 - 4 /hpf    RBC 5-10 0 - 5 /hpf Epithelial cells FEW FEW /lpf    Bacteria NEGATIVE  NEG /hpf    UA:UC IF INDICATED CULTURE NOT INDICATED BY UA RESULT CNI      Hyaline cast 0-2 0 - 5 /lpf   EKG, 12 LEAD, INITIAL    Collection Time: 03/11/20  2:53 AM   Result Value Ref Range    Ventricular Rate 93 BPM    Atrial Rate 93 BPM    P-R Interval 164 ms    QRS Duration 96 ms    Q-T Interval 368 ms    QTC Calculation (Bezet) 457 ms    Calculated P Axis 76 degrees    Calculated R Axis 115 degrees    Calculated T Axis 41 degrees    Diagnosis       Normal sinus rhythm  Possible Left atrial enlargement  Right axis deviation  Right ventricular hypertrophy  No previous ECGs available  Confirmed by Doormen. ([id number]) on 3/11/2020 9:28:47 AM     SAMPLES BEING HELD    Collection Time: 03/11/20  2:58 AM   Result Value Ref Range    SAMPLES BEING HELD PST     COMMENT        Add-on orders for these samples will be processed based on acceptable specimen integrity and analyte stability, which may vary by analyte. HEMOGLOBIN    Collection Time: 03/11/20  5:14 AM   Result Value Ref Range    HGB 12.1 12.1 - 17.0 g/dL   LACTIC ACID    Collection Time: 03/11/20  8:10 AM   Result Value Ref Range    Lactic acid 1.9 0.4 - 2.0 MMOL/L   METABOLIC PANEL, COMPREHENSIVE    Collection Time: 03/11/20  9:41 AM   Result Value Ref Range    Sodium 139 136 - 145 mmol/L    Potassium 3.6 3.5 - 5.1 mmol/L    Chloride 110 (H) 97 - 108 mmol/L    CO2 21 21 - 32 mmol/L    Anion gap 8 5 - 15 mmol/L    Glucose 134 (H) 65 - 100 mg/dL    BUN 10 6 - 20 MG/DL    Creatinine 1.15 0.70 - 1.30 MG/DL    BUN/Creatinine ratio 9 (L) 12 - 20      GFR est AA >60 >60 ml/min/1.73m2    GFR est non-AA >60 >60 ml/min/1.73m2    Calcium 6.5 (L) 8.5 - 10.1 MG/DL    Bilirubin, total 1.5 (H) 0.2 - 1.0 MG/DL    ALT (SGPT) 25 12 - 78 U/L    AST (SGOT) 19 15 - 37 U/L    Alk.  phosphatase 30 (L) 45 - 117 U/L    Protein, total 5.2 (L) 6.4 - 8.2 g/dL    Albumin 2.8 (L) 3.5 - 5.0 g/dL    Globulin 2.4 2.0 - 4.0 g/dL    A-G Ratio 1.2 1.1 - 2.2

## 2020-03-11 NOTE — PROGRESS NOTES
Bedside shift change report given to Malissa Guevara RN (oncoming nurse) by Nubia Isaacs RN (offgoing nurse). Report included the following information SBAR, Kardex, Intake/Output, MAR, Recent Results and Cardiac Rhythm NSR.    1945: Pt A&Ox4, resting in bed NAD. Pt denies lightheadedness or dizziness. Soft BPs unchanged from ED. Will check manual BP. Pt educated on plan of care and will notify RN if he becomes symptomatic. Pt verbalizes understanding and agrees to call out for staff assist to bathroom. Denies pain, nausea. Call bell within reach. 2300: Manual /60. Pt remains asymptomatic. Requesting Tylenol for soreness from head lac. Administered per PRN orders. 0215: Pt ambulated x1 assist to bathroom. No dizziness or lightheadedness. 0715: Bedside shift change report given to [address] (oncoming nurse) by Malissa Guevara RN (offgoing nurse). Report included the following information SBAR, Kardex, Intake/Output, MAR, Recent Results and Cardiac Rhythm NSR.

## 2020-03-11 NOTE — PROGRESS NOTES
1429 TRANSFER - IN REPORT:    Verbal report received from Zeina Hartman [facility] (name) on Malia[address] 6  being received from ED (unit) for routine progression of care      Report consisted of patients Situation, Background, Assessment and   Recommendations(SBAR). Information from the following report(s) SBAR, Kardex, Intake/Output, MAR, Recent Results and Cardiac Rhythm NSR was reviewed with the receiving nurse. Opportunity for questions and clarification was provided. Assessment completed upon patients arrival to unit and care assumed. Primary Nurse Lidia Parra and Amanuel Bocanegra RN performed a dual skin assessment on this patient. No impairment noted. Lee score is 21.    1430 Pt resting quietly in bed at this time. VSS. BP 98/58. Pt encouraged to use call bell if he needs to use the restroom as he is still at risk for falls. Pt verbalized understanding. 200 Pt assisted to bedside commode. Tolerated activity well without any dizziness or changes in LOC. Pt returned to bed after bowel movement. 1735 Pt complaining of pain 4/10 at site of laceration. PRN tylenol administered. 1900 Bedside shift change report given to Mouna Bartlett RN (oncoming nurse) by BRAN Arambula (offgoing nurse). Report included the following information SBAR, Kardex, Intake/Output, MAR, Recent Results and Cardiac Rhythm NSR.

## 2020-03-11 NOTE — PROGRESS NOTES
Problem: Risk for Spread of Infection  Goal: Prevent transmission of infectious organism to others  Description: Prevent the transmission of infectious organisms to other patients, staff members, and visitors. Outcome: Progressing Towards Goal     Problem: Patient Education:  Go to Education Activity  Goal: Patient/Family Education  Outcome: Progressing Towards Goal     Problem: Falls - Risk of  Goal: *Absence of Falls  Description: Document Jeffrey Herrera Fall Risk and appropriate interventions in the flowsheet.   Outcome: Progressing Towards Goal  Note: Fall Risk Interventions:                      History of Falls Interventions: Bed/chair exit alarm, Door open when patient unattended, Evaluate medications/consider consulting pharmacy, Investigate reason for fall, Room close to nurse's station         Problem: Patient Education: Go to Patient Education Activity  Goal: Patient/Family Education  Outcome: Progressing Towards Goal     Problem: Hypotension  Goal: *Blood pressure within specified parameters  Outcome: Progressing Towards Goal     Problem: Hypotension  Goal: *Blood pressure within specified parameters  Outcome: Progressing Towards Goal  Goal: *Fluid volume balance  Outcome: Progressing Towards Goal  Goal: *Labs within defined limits  Outcome: Progressing Towards Goal     Problem: Patient Education: Go to Patient Education Activity  Goal: Patient/Family Education  Outcome: Progressing Towards Goal

## 2020-03-11 NOTE — CONSULTS
GI CONSULTATION NOTE  Lowanda Cogan, KIARA  [phone] NP in-hospital cell phone M-F until 4:30  After 5pm or on weekends, please call  for physician on call    NAME: Brady Mahoney   :  1965   MRN:  [id number]   Attending:  Dr Renny Wood   Primary GI:  Dr Yennifer Beckwith here at [facility] but spouse sees Dr Jared Weiss and wants pt to follow up with him after hospitalization. Date/Time:  3/11/2020 2:33 PM  Assessment:   Abnormal CT scan - pancolitis  Diarrhea likely infectious  - No leukocytosis, no fevers, or chills  - CT abd/pel w contrast - Diffuse colitis which is likely infectious or inflammatory. - Likely dehyadrated with hgb 15.0  - Recent use of 2 antibiotics x 3 days last week s/p prostate biopsy - cipro and unknown other abx  - nontender abdomen  - hypotensive on arrival but now stabalizing with 's  - mucoid yellow stool per nursing  - stool specimen sent today  - Nausea and abdominal discomfort after food this AM    Syncopal episode with head laceration  - staples in place  -     Plan:   1. No endoscopic procedures at this time during acute inflammation in colon. 2. Agree with antibiotics - flagyl, levaquin, and vanc for now - hopefully can narrow when stool studies return  3. Follow stool studies - specimen received  4. Maintain CLD today - did not tolerate food well this AM  5. Antiemetics PRN  6. Agree with IV fluids  7. Will need outpatient colonoscopy as he hasn't had one previously timing is dependent a bit of findings here. Thank you for consultation. Plan discussed with Dr Yennifer Beckwith. Subjective:     HISTORY OF PRESENT ILLNESS:     Brady Mahoney is an 49 y.o.  male who we are asked to see for complaint of pan colitis. Medical history childhood asthma and recent elevation in PSA. On 3/3/2020, patient had prostate biopsy and normal finding on recent MRI which was done for elevated PSA.  After bx, pt was treated with 3 days of antibiotics cipro and another unknown antibiotic. Pt states he was feeling fine until yesterday when he has some lower abdominal discomfort while at work and then had profuse diarrhea without blood. Pt went to urgent care last night and was tested for the flu which was negative and sent home. He awoke suddenly at 2230 and needed to have another BM. States he passing stool, but the next thing he knew, he was on the ground with lots of blood. Syncopal episode with head laceration. Pt arrive to ER and has been hypotensive - received about 6L of IV fluid and BP is now in the 100s/60s. Denies mucous in stool. No fevers or chills. No previous colonoscopy. NO family hx of IBD. No weight loss. 3/10/2020-WBC 7.5, Hgb 15.0, HCT 41.2, MCV 91.4, , , 3.2, creatinine 1.44, BUN 13, T bili 1.8, ALT 31, AST 26, alk phos 433, lactic acid 1.9    CT abd/pel 3/11 with contrast - Diffuse colitis which is likely infectious or inflammatory. Past Medical History:   Diagnosis Date    Asthma     as child    Contracture of hand joint     Dupytens Contracture of both hands    Sun-damaged skin       Past Surgical History:   Procedure Laterality Date    HX OTHER SURGICAL      bilateral Dupytren contracture repair    HX VASECTOMY       Social History     Tobacco Use    Smoking status: Never Smoker    Smokeless tobacco: Never Used   Substance Use Topics    Alcohol use: Yes     Alcohol/week: 1.7 - 2.5 standard drinks     Types: 2 - 3 Standard drinks or equivalent per week      Family History   Problem Relation Age of Onset    Cancer Father         lung    Heart Disease Paternal Grandfather     Elevated Lipids Mother     Diabetes Maternal Grandmother     Diabetes Maternal Grandfather     Alzheimer Maternal Grandfather     Cancer Daughter         neuroblastoma      No Known Allergies   Prior to Admission medications    Medication Sig Start Date End Date Taking?  Authorizing Provider   diphenoxylate-atropine (LOMOTIL) 2.5-0.025 mg per tablet Take 1 Tab by mouth four (4) times daily as needed for Diarrhea (1 tab after each stool for max 8 per day). Max Daily Amount: 4 Tabs. Take after each stool for a maximum of 8 tablets daily 3/11/20  Yes Branden Pitts MD   ondansetron [facility] ODT) 4 mg disintegrating tablet Take 1 Tab by mouth every eight (8) hours as needed for Nausea. 3/11/20  Yes Branden Pitst MD   cetirizine (ZYRTEC) 10 mg tablet Take 10 mg by mouth daily as needed. 1/5/15  Yes Arnie Herring MD       Patient Active Problem List   Diagnosis Code    TABATHA (obstructive sleep apnea) G47.33    Elevated PSA, less than 10 ng/ml R97.20    Colitis K52.9    Syncope R55    Hypotension I95.9    Scalp laceration S01. 01XA    PILLO (acute kidney injury) ([facility] [place] [address] N17.9       REVIEW OF SYSTEMS:    Constitutional: negative fever, negative chills, negative weight loss  Eyes:   negative visual changes  ENT:   negative sore throat, tongue or lip swelling   Respiratory:  negative cough, negative dyspnea  Cards:  negative for chest pain, palpitations, lower extremity edema  GI:   See HPI  :  negative for frequency, dysuria  Integument:  negative for rash and pruritus  Heme:  negative for easy bruising and gum/nose bleeding  Musculoskel: negative for myalgias,  back pain and muscle weakness  Neuro: negative for headaches, dizziness, vertigo  Psych:  negative for feelings of anxiety, depression     Pertinent Positives: nausea, fatigue, abdominal discomfort, diarrhea      Objective:   VITALS:    Visit Vitals  BP 98/58 (BP 1 Location: Left arm, BP Patient Position: At rest)   Pulse 93   Temp 100 °F (37.8 °C)   Resp 18   Ht 6' (1.829 m)   Wt 74.6 kg (164 lb 7.4 oz)   SpO2 97%   BMI 22.31 kg/m²       PHYSICAL EXAM:   General:          Alert, WD, WN, cooperative, no distress, appears stated age. Head:               Normocephalic, without obvious abnormality, atraumatic. Eyes:               Conjunctivae clear and pale, anicteric sclerae.   Pupils are equal  Nose:               Nares normal.   Throat:             Lips, mucosa, and tongue normal.    Neck:               Supple, symmetrical,  no adenopathy  Back:               Symmetric,  No CVA tenderness. Lungs:             CTA bilaterally. No wheezing/rhonchi/rales. Chest wall:      No tenderness or deformity. No Accessory muscle use. Heart:              Regular rate and rhythm,  no murmur, rub or gallop. Abdomen:        Soft, non-tender. Not distended. Bowel sounds normal. No masses  Extremities:     Atraumatic, No cyanosis. No edema. Skin:                Texture, turgor normal. No rashes/lesions/jaundice  Lymph:            Cervical, supraclavicular normal.  Psych:             Good insight. Not depressed. Not anxious or agitated. Neurologic:      EOMs intact. No facial asymmetry. No aphasia or slurred speech. Normal                        strength, A/O X 3. LAB DATA REVIEWED:    Recent Results (from the past 24 hour(s))   CBC WITH AUTOMATED DIFF    Collection Time: 03/10/20 11:35 PM   Result Value Ref Range    WBC 7.5 4.1 - 11.1 K/uL    RBC 4.51 4.10 - 5.70 M/uL    HGB 15.0 12.1 - 17.0 g/dL    HCT 41.2 36.6 - 50.3 %    MCV 91.4 80.0 - 99.0 FL    MCH 33.3 26.0 - 34.0 PG    MCHC 36.4 30.0 - 36.5 g/dL    RDW 11.9 11.5 - 14.5 %    PLATELET 877 (L) 500 - 400 K/uL    MPV 9.5 8.9 - 12.9 FL    NRBC 0.0 0  WBC    ABSOLUTE NRBC 0.00 0.00 - 0.01 K/uL    NEUTROPHILS 94 (H) 32 - 75 %    LYMPHOCYTES 4 (L) 12 - 49 %    MONOCYTES 2 (L) 5 - 13 %    EOSINOPHILS 0 0 - 7 %    BASOPHILS 0 0 - 1 %    IMMATURE GRANULOCYTES 0 0.0 - 0.5 %    ABS. NEUTROPHILS 7.0 1.8 - 8.0 K/UL    ABS. LYMPHOCYTES 0.3 (L) 0.8 - 3.5 K/UL    ABS. MONOCYTES 0.2 0.0 - 1.0 K/UL    ABS. EOSINOPHILS 0.0 0.0 - 0.4 K/UL    ABS. BASOPHILS 0.0 0.0 - 0.1 K/UL    ABS. IMM.  GRANS. 0.0 0.00 - 0.04 K/UL    DF SMEAR SCANNED      RBC COMMENTS NORMOCYTIC, NORMOCHROMIC     METABOLIC PANEL, COMPREHENSIVE    Collection Time: 03/10/20 11:35 PM   Result Value Ref Range    Sodium 137 136 - 145 mmol/L    Potassium 3.2 (L) 3.5 - 5.1 mmol/L    Chloride 104 97 - 108 mmol/L    CO2 26 21 - 32 mmol/L    Anion gap 7 5 - 15 mmol/L    Glucose 145 (H) 65 - 100 mg/dL    BUN 13 6 - 20 MG/DL    Creatinine 1.44 (H) 0.70 - 1.30 MG/DL    BUN/Creatinine ratio 9 (L) 12 - 20      GFR est AA >60 >60 ml/min/1.73m2    GFR est non-AA 51 (L) >60 ml/min/1.73m2    Calcium 8.5 8.5 - 10.1 MG/DL    Bilirubin, total 1.8 (H) 0.2 - 1.0 MG/DL    ALT (SGPT) 31 12 - 78 U/L    AST (SGOT) 26 15 - 37 U/L    Alk.  phosphatase 43 (L) 45 - 117 U/L    Protein, total 6.9 6.4 - 8.2 g/dL    Albumin 3.8 3.5 - 5.0 g/dL    Globulin 3.1 2.0 - 4.0 g/dL    A-G Ratio 1.2 1.1 - 2.2     URINALYSIS W/ REFLEX CULTURE    Collection Time: 03/11/20  1:01 AM   Result Value Ref Range    Color DARK YELLOW      Appearance CLEAR CLEAR      Specific gravity 1.018 1.003 - 1.030      pH (UA) 5.5 5.0 - 8.0      Protein NEGATIVE  NEG mg/dL    Glucose NEGATIVE  NEG mg/dL    Ketone TRACE (A) NEG mg/dL    Bilirubin NEGATIVE  NEG      Blood MODERATE (A) NEG      Urobilinogen 0.2 0.2 - 1.0 EU/dL    Nitrites NEGATIVE  NEG      Leukocyte Esterase NEGATIVE  NEG      WBC 0-4 0 - 4 /hpf    RBC 5-10 0 - 5 /hpf    Epithelial cells FEW FEW /lpf    Bacteria NEGATIVE  NEG /hpf    UA:UC IF INDICATED CULTURE NOT INDICATED BY UA RESULT CNI      Hyaline cast 0-2 0 - 5 /lpf   EKG, 12 LEAD, INITIAL    Collection Time: 03/11/20  2:53 AM   Result Value Ref Range    Ventricular Rate 93 BPM    Atrial Rate 93 BPM    P-R Interval 164 ms    QRS Duration 96 ms    Q-T Interval 368 ms    QTC Calculation (Bezet) 457 ms    Calculated P Axis 76 degrees    Calculated R Axis 115 degrees    Calculated T Axis 41 degrees    Diagnosis       Normal sinus rhythm  Possible Left atrial enlargement  Right axis deviation  Right ventricular hypertrophy  No previous ECGs available  Confirmed by Anam Drake ([id number]) on 3/11/2020 9:28:47 AM     SAMPLES BEING HELD    Collection Time: 03/11/20  2:58 AM   Result Value Ref Range    SAMPLES BEING HELD PST     COMMENT        Add-on orders for these samples will be processed based on acceptable specimen integrity and analyte stability, which may vary by analyte. HEMOGLOBIN    Collection Time: 03/11/20  5:14 AM   Result Value Ref Range    HGB 12.1 12.1 - 17.0 g/dL   LACTIC ACID    Collection Time: 03/11/20  8:10 AM   Result Value Ref Range    Lactic acid 1.9 0.4 - 2.0 MMOL/L   METABOLIC PANEL, COMPREHENSIVE    Collection Time: 03/11/20  9:41 AM   Result Value Ref Range    Sodium 139 136 - 145 mmol/L    Potassium 3.6 3.5 - 5.1 mmol/L    Chloride 110 (H) 97 - 108 mmol/L    CO2 21 21 - 32 mmol/L    Anion gap 8 5 - 15 mmol/L    Glucose 134 (H) 65 - 100 mg/dL    BUN 10 6 - 20 MG/DL    Creatinine 1.15 0.70 - 1.30 MG/DL    BUN/Creatinine ratio 9 (L) 12 - 20      GFR est AA >60 >60 ml/min/1.73m2    GFR est non-AA >60 >60 ml/min/1.73m2    Calcium 6.5 (L) 8.5 - 10.1 MG/DL    Bilirubin, total 1.5 (H) 0.2 - 1.0 MG/DL    ALT (SGPT) 25 12 - 78 U/L    AST (SGOT) 19 15 - 37 U/L    Alk. phosphatase 30 (L) 45 - 117 U/L    Protein, total 5.2 (L) 6.4 - 8.2 g/dL    Albumin 2.8 (L) 3.5 - 5.0 g/dL    Globulin 2.4 2.0 - 4.0 g/dL    A-G Ratio 1.2 1.1 - 2.2     OCCULT BLOOD, STOOL    Collection Time: 03/11/20 11:36 AM   Result Value Ref Range    Occult blood, stool POSITIVE (A) NEG         IMAGING RESULTS:  I have personally reviewed the imaging reports    DATE: 3/11/2020   EXAM: CT ABD PELV W CONT     INDICATION: diarrhea, hypotension     COMPARISON: None      CONTRAST: 100 mL of Isovue-370.     TECHNIQUE:   Following the uneventful intravenous administration of contrast, thin axial  images were obtained through the abdomen and pelvis. Coronal and sagittal  reconstructions were generated. Oral contrast was not administered.  CT dose  reduction was achieved through use of a standardized protocol tailored for this  examination and automatic exposure control for dose modulation.     FINDINGS:   LUNG BASES: Bibasilar hypoventilatory change. INCIDENTALLY IMAGED HEART AND MEDIASTINUM: Unremarkable. LIVER: No mass or biliary dilatation. GALLBLADDER: Unremarkable. SPLEEN: No mass. PANCREAS: No mass or ductal dilatation. ADRENALS: Unremarkable. KIDNEYS: No mass, calculus, or hydronephrosis. STOMACH: Unremarkable. SMALL BOWEL: No dilatation or wall thickening. COLON: Significant thickening of the colon. APPENDIX: Unremarkable. PERITONEUM: No ascites or pneumoperitoneum. RETROPERITONEUM: No lymphadenopathy or aortic aneurysm. REPRODUCTIVE ORGANS: Normal.  URINARY BLADDER: No mass or calculus. BONES: No destructive bone lesion. ADDITIONAL COMMENTS: N/A     IMPRESSION  IMPRESSION:  Diffuse colitis which is likely infectious or inflammatory. Total time spent with patient: 50 minutes ________________________________________________________________________  Care Plan discussed with:  Patient y   Family  anisa Sorenson spoke to spouse   RN y in ER              Consultant:  salvatore He  3/11/2020:  ________________________________________________________________________    ___________________________________________________  Consulting Provider:  Diane Rivas NP      3/11/2020  2:33 PM

## 2020-03-11 NOTE — ED NOTES
TRANSFER - OUT REPORT:    Verbal report given to [facility] RN(name) on Maliaie 6  being transferred to PCU(unit) for routine progression of care       Report consisted of patients Situation, Background, Assessment and   Recommendations(SBAR). Information from the following report(s) SBAR, Kardex and ED Summary was reviewed with the receiving nurse. Lines:   Peripheral IV 03/11/20 Right Antecubital (Active)   Site Assessment Clean, dry, & intact 3/11/2020 12:53 AM   Phlebitis Assessment 0 3/11/2020 12:53 AM   Infiltration Assessment 0 3/11/2020 12:53 AM   Dressing Status Clean, dry, & intact 3/11/2020 12:53 AM       Peripheral IV 03/11/20 Left Antecubital (Active)   Site Assessment Clean, dry, & intact 3/11/2020  4:34 AM   Phlebitis Assessment 0 3/11/2020  4:34 AM   Infiltration Assessment 0 3/11/2020  4:34 AM   Dressing Status Clean, dry, & intact 3/11/2020  4:34 AM        Opportunity for questions and clarification was provided.       Patient transported with:   RN

## 2020-03-11 NOTE — DISCHARGE INSTRUCTIONS
Patient Education        Diarrhea: Care Instructions  Your Care Instructions    Diarrhea is loose, watery stools (bowel movements). The exact cause is often hard to find. Sometimes diarrhea is your body's way of getting rid of what caused an upset stomach. Viruses, food poisoning, and many medicines can cause diarrhea. Some people get diarrhea in response to emotional stress, anxiety, or certain foods. Almost everyone has diarrhea now and then. It usually isn't serious, and your stools will return to normal soon. The important thing to do is replace the fluids you have lost, so you can prevent dehydration. The doctor has checked you carefully, but problems can develop later. If you notice any problems or new symptoms, get medical treatment right away. Follow-up care is a key part of your treatment and safety. Be sure to make and go to all appointments, and call your doctor if you are having problems. It's also a good idea to know your test results and keep a list of the medicines you take. How can you care for yourself at home? · Watch for signs of dehydration, which means your body has lost too much water. Dehydration is a serious condition and should be treated right away. Signs of dehydration are:  ? Increasing thirst and dry eyes and mouth. ? Feeling faint or lightheaded. ? A smaller amount of urine than normal.  · To prevent dehydration, drink plenty of fluids. Choose water and other caffeine-free clear liquids until you feel better. If you have kidney, heart, or liver disease and have to limit fluids, talk with your doctor before you increase the amount of fluids you drink. · Begin eating small amounts of mild foods the next day, if you feel like it. ? Try yogurt that has live cultures of Lactobacillus. (Check the label.)  ? Avoid spicy foods, fruits, alcohol, and caffeine until 48 hours after all symptoms are gone. ? Avoid chewing gum that contains sorbitol. ?  Avoid dairy products (except for yogurt with Lactobacillus) while you have diarrhea and for 3 days after symptoms are gone. · The doctor may recommend that you take over-the-counter medicine, such as loperamide (Imodium), if you still have diarrhea after 6 hours. Read and follow all instructions on the label. Do not use this medicine if you have bloody diarrhea, a high fever, or other signs of serious illness. Call your doctor if you think you are having a problem with your medicine. When should you call for help? Call 911 anytime you think you may need emergency care. For example, call if:    · You passed out (lost consciousness).     · Your stools are maroon or very bloody.    Call your doctor now or seek immediate medical care if:    · You are dizzy or lightheaded, or you feel like you may faint.     · Your stools are black and look like tar, or they have streaks of blood.     · You have new or worse belly pain.     · You have symptoms of dehydration, such as:  ? Dry eyes and a dry mouth. ? Passing only a little dark urine. ? Feeling thirstier than usual.     · You have a new or higher fever.    Watch closely for changes in your health, and be sure to contact your doctor if:    · Your diarrhea is getting worse.     · You see pus in the diarrhea.     · You are not getting better after 2 days (48 hours). Where can you learn more? Go to http://phylicia-maryana.info/. Enter L402 in the search box to learn more about \"Diarrhea: Care Instructions. \"  Current as of: June 26, 2019  Content Version: 12.2  © 2686-4001 [employer]. Care instructions adapted under license by "[employer] (which disclaims liability or warranty for this information). If you have questions about a medical condition or this instruction, always ask your healthcare professional. [place] [address] any warranty or liability for your use of this information.          Patient Education        Dehydration: Care Instructions  Your Care Instructions  Dehydration happens when your body loses too much fluid. This might happen when you do not drink enough water or you lose large amounts of fluids from your body because of diarrhea, vomiting, or sweating. Severe dehydration can be life-threatening. Water and minerals called electrolytes help put your body fluids back in balance. Learn the early signs of fluid loss, and drink more fluids to prevent dehydration. Follow-up care is a key part of your treatment and safety. Be sure to make and go to all appointments, and call your doctor if you are having problems. It's also a good idea to know your test results and keep a list of the medicines you take. How can you care for yourself at home? · To prevent dehydration, drink plenty of fluids, enough so that your urine is light yellow or clear like water. Choose water and other caffeine-free clear liquids until you feel better. If you have kidney, heart, or liver disease and have to limit fluids, talk with your doctor before you increase the amount of fluids you drink. · If you do not feel like eating or drinking, try taking small sips of water, sports drinks, or other rehydration drinks. · Get plenty of rest.  To prevent dehydration  · Add more fluids to your diet and daily routine, unless your doctor has told you not to. · During hot weather, drink more fluids. Drink even more fluids if you exercise a lot. Stay away from drinks with alcohol or caffeine. · Watch for the symptoms of dehydration. These include:  ? A dry, sticky mouth. ? Dark yellow urine, and not much of it. ? Dry and sunken eyes. ? Feeling very tired. · Learn what problems can lead to dehydration. These include:  ? Diarrhea, fever, and vomiting. ? Any illness with a fever, such as pneumonia or the flu. ? Activities that cause heavy sweating, such as endurance races and heavy outdoor work in hot or humid weather. ?  Alcohol or drug use or problems caused by quitting their use (withdrawal). ? Certain medicines, such as cold and allergy pills (antihistamines), diet pills (diuretics), and laxatives. ? Certain diseases, such as diabetes, cancer, and heart or kidney disease. When should you call for help? Call 911 anytime you think you may need emergency care. For example, call if:    · You passed out (lost consciousness).    Call your doctor now or seek immediate medical care if:    · You are confused and cannot think clearly.     · You are dizzy or lightheaded, or you feel like you may faint.     · You have signs of needing more fluids. You have sunken eyes and a dry mouth, and you pass only a little dark urine.     · You cannot keep fluids down.    Watch closely for changes in your health, and be sure to contact your doctor if:    · You are not making tears.     · Your skin is very dry and sags slowly back into place after you pinch it.     · Your mouth and eyes are very dry. Where can you learn more? Go to http://phylicia-maryana.info/. Enter H081 in the search box to learn more about \"Dehydration: Care Instructions. \"  Current as of: June 26, 2019  Content Version: 12.2  © 6495-1700 [employer]. Care instructions adapted under license by [employer] (which disclaims liability or warranty for this information). If you have questions about a medical condition or this instruction, always ask your healthcare professional. [place] [address] any warranty or liability for your use of this information. Patient Education        Cuts Closed With Staples: Care Instructions  Your Care Instructions  A cut can happen anywhere on your body. The doctor used staples to close the cut. Staples easily and quickly close a cut, which helps the cut heal.  Sometimes a cut can injure tendons, blood vessels, or nerves.  If the cut went deep and through the skin, the doctor may have put in a layer of stitches below the staples. The deeper layer of stitches brings the deep part of the cut together. These stitches will dissolve and don't need to be removed. The staples in the upper layer are what you see on the cut. You may have a bandage. You will need to have the staples removed, usually in 7 to 14 days. The doctor has checked you carefully, but problems can develop later. If you notice any problems or new symptoms, get medical treatment right away. Follow-up care is a key part of your treatment and safety. Be sure to make and go to all appointments, and call your doctor if you are having problems. It's also a good idea to know your test results and keep a list of the medicines you take. How can you care for yourself at home? · Keep the cut dry for the first 24 to 48 hours. After this, you can shower if your doctor okays it. Pat the cut dry. · Don't soak the cut, such as in a bathtub. Your doctor will tell you when it's safe to get the cut wet. · If your doctor told you how to care for your cut, follow your doctor's instructions. If you did not get instructions, follow this general advice:  ? After the first 24 to 48 hours, wash around the cut with clean water 2 times a day. Don't use hydrogen peroxide or alcohol, which can slow healing. ? You may cover the cut with a thin layer of petroleum jelly, such as Vaseline, and a nonstick bandage. ? Apply more petroleum jelly and replace the bandage as needed. · Avoid any activity that could cause your cut to reopen. · Do not remove the staples on your own. Your doctor will tell you when to come back to have the staples removed. · Take pain medicines exactly as directed. ? If the doctor gave you a prescription medicine for pain, take it as prescribed. ? If you are not taking a prescription pain medicine, ask your doctor if you can take an over-the-counter medicine. When should you call for help?   Call your doctor now or seek immediate medical care if:    · You have new pain, or your pain gets worse.     · The skin near the cut is cold or pale or changes color.     · You have tingling, weakness, or numbness near the cut.     · The cut starts to bleed, and blood soaks through the bandage. Oozing small amounts of blood is normal.     · You have trouble moving the area near the cut.     · You have symptoms of infection, such as:  ? Increased pain, swelling, warmth, or redness around the cut.  ? Red streaks leading from the cut.  ? Pus draining from the cut.  ? A fever.    Watch closely for changes in your health, and be sure to contact your doctor if:    · You do not get better as expected. Where can you learn more? Go to http://phylicia-maryana.info/. Enter H350 in the search box to learn more about \"Cuts Closed With Staples: Care Instructions. \"  Current as of: June 26, 2019  Content Version: 12.2  © 0875-5897 [employer]. Care instructions adapted under license by [employer] (which disclaims liability or warranty for this information). If you have questions about a medical condition or this instruction, always ask your healthcare professional. [place] [address] any warranty or liability for your use of this information. Patient Education        Lightheadedness or Faintness: Care Instructions  Your Care Instructions  Lightheadedness is a feeling that you are about to faint or \"pass out. \" You do not feel as if you or your surroundings are moving. It is different from vertigo, which is the feeling that you or things around you are spinning or tilting. Lightheadedness usually goes away or gets better when you lie down. If lightheadedness gets worse, it can lead to a fainting spell. It is common to feel lightheaded from time to time. Lightheadedness usually is not caused by a serious problem.  It often is caused by a short-lasting drop in blood pressure and blood flow to your head that occurs when you get up too quickly from a seated or lying position. Follow-up care is a key part of your treatment and safety. Be sure to make and go to all appointments, and call your doctor if you are having problems. It's also a good idea to know your test results and keep a list of the medicines you take. How can you care for yourself at home? · Lie down for 1 or 2 minutes when you feel lightheaded. After lying down, sit up slowly and remain sitting for 1 to 2 minutes before slowly standing up. · Avoid movements, positions, or activities that have made you lightheaded in the past.  · Get plenty of rest, especially if you have a cold or flu, which can cause lightheadedness. · Make sure you drink plenty of fluids, especially if you have a fever or have been sweating. · Do not drive or put yourself and others in danger while you feel lightheaded. When should you call for help? Call 911 anytime you think you may need emergency care. For example, call if:    · You have symptoms of a stroke. These may include:  ? Sudden numbness, tingling, weakness, or loss of movement in your face, arm, or leg, especially on only one side of your body. ? Sudden vision changes. ? Sudden trouble speaking. ? Sudden confusion or trouble understanding simple statements. ? Sudden problems with walking or balance. ? A sudden, severe headache that is different from past headaches.     · You have symptoms of a heart attack. These may include:  ? Chest pain or pressure, or a strange feeling in the chest.  ? Sweating. ? Shortness of breath. ? Nausea or vomiting. ? Pain, pressure, or a strange feeling in the back, neck, jaw, or upper belly or in one or both shoulders or arms. ? Lightheadedness or sudden weakness. ? A fast or irregular heartbeat. After you call 911, the  may tell you to chew 1 adult-strength or 2 to 4 low-dose aspirin. Wait for an ambulance.  Do not try to drive yourself.    Watch closely for changes in your health, and be sure to contact your doctor if:    · Your lightheadedness gets worse or does not get better with home care. Where can you learn more? Go to http://phylicia-maryana.info/. Enter P720 in the search box to learn more about \"Lightheadedness or Faintness: Care Instructions. \"  Current as of: June 26, 2019  Content Version: 12.2  © 5150-2976 "[employer]. Care instructions adapted under license by [employer] (which disclaims liability or warranty for this information). If you have questions about a medical condition or this instruction, always ask your healthcare professional. [place] [address] any warranty or liability for your use of this information. Patient Education        Oral Rehydration: Care Instructions  Your Care Instructions    Dehydration occurs when your body loses too much water. This can happen if you do not drink enough fluids or lose a lot of fluid due to diarrhea, vomiting, or sweating. Being dehydrated can cause health problems and can even be life-threatening. To replace lost fluids, you need to drink liquid that contains special chemicals called electrolytes. Electrolytes keep your body working well. Plain water does not have electrolytes. You also need to rest to prevent more fluid loss. Replacing water and electrolytes (oral rehydration) completely takes about 36 hours. But you should feel better within a few hours. Follow-up care is a key part of your treatment and safety. Be sure to make and go to all appointments, and call your doctor if you are having problems. It's also a good idea to know your test results and keep a list of the medicines you take. How can you care for yourself at home? · Take frequent sips of a drink such as Gatorade, Powerade, or other rehydration drinks that your doctor suggests. These replace both fluid and important chemicals (electrolytes) you need for balance in your blood.   · Drink 2 quarts of cool liquid over 2 to 4 hours. You should have at least 10 glasses of liquid a day to replace lost fluid. If you have kidney, heart, or liver disease and have to limit fluids, talk with your doctor before you increase the amount of fluids you drink. · Make your own drink. Measure everything carefully. The drink may not work well or may even be harmful if the amounts are off. ? 1 quart water  ? ½ teaspoon salt  ? 6 teaspoons sugar  · Do not drink liquid with caffeine, such as coffee and prosper. · Do not drink any alcohol. It can make you dehydrated. · Drink plenty of fluids, enough so that your urine is light yellow or clear like water. If you have kidney, heart, or liver disease and have to limit fluids, talk with your doctor before you increase the amount of fluids you drink. When should you call for help? Call 911 anytime you think you may need emergency care. For example, call if:    · You have signs of severe dehydration, such as:  ? You are confused or unable to stay awake.  ? You passed out (lost consciousness).    Call your doctor now or seek immediate medical care if:    · You still have signs of dehydration. You have sunken eyes and a dry mouth, and you pass only a little dark urine.     · You are dizzy or lightheaded, or you feel like you may faint.     · You are not able to keep down fluids.    Watch closely for changes in your health, and be sure to contact your doctor if:    · You do not get better as expected. Where can you learn more? Go to http://phylicia-maryana.info/. Enter I040 in the search box to learn more about \"Oral Rehydration: Care Instructions. \"  Current as of: June 26, 2019  Content Version: 12.2  © 5592-5469 [employer]. Care instructions adapted under license by [employer] (which disclaims liability or warranty for this information).  If you have questions about a medical condition or this instruction, always ask your healthcare professional. [employer] disclaims any warranty or liability for your use of this information.

## 2020-03-11 NOTE — PROGRESS NOTES
Pharmacy Automatic Renal Dosing Protocol - Antimicrobials    Indication for Antimicrobials: intnra-abdominal infection     Current Regimen of Each Antimicrobial:  Levofloxacin 750mg IV every 24 (Start Date 3/11; Day # 1)  Metronidazole 500mg IV every 12h (start 3/11, day 1)    Previous Antimicrobial Therapy:      Significant Cultures:   3/11: paired blood - pending    Radiology / Imaging results: (X-ray, CT scan or MRI):   3/10: CT head: no acute findings  3/11: IMPRESSION:  Diffuse colitis which is likely infectious or inflammatory. Paralysis, amputations, malnutrition: none    Labs:  Recent Labs     03/10/20  2335   CREA 1.44*   BUN 13   WBC 7.5     Temp (24hrs), Av.6 °F (37.6 °C), Min:99.4 °F (37.4 °C), Max:99.7 °F (37.6 °C)    Creatinine Clearance (mL/min) or Dialysis: ~63ml/min    Impression/Plan:   Continue levolfoxacin  Changed metronidazole to 500mg IV every 12hr per protocol  Antimicrobial stop date to be determined     Pharmacy will follow daily and adjust medications as appropriate for renal function and/or serum levels. Thank you,  DELMA Feliciano    Recommended duration of therapy  [email or web address]/[place]/[facility]/Pharmacy/Clinical%20Companion/[email or web address]. docx    Renal Dosing  [email or web address]/[facility]/Pharmacy/Clinical%20Companion/[email or web address]. pdf

## 2020-03-11 NOTE — ED NOTES
Assumed pt care from [facility]. Plan of care discussed and all questions answered.

## 2020-03-11 NOTE — PROGRESS NOTES
Pharmacy Medication Reconciliation     The patient was interviewed regarding current PTA medication list, use and drug allergies; Patient's wife present in room and obtained permission from patient to discuss drug regimen with visitor(s) present. The patient was questioned regarding use of any other inhalers, topical products, over the counter medications, herbal medications, vitamin products or ophthalmic/nasal/otic medication use. Allergy Update: Patient has no known allergies. Recommendations/Findings: The following amendments were made to the [id number]:   1) Additions: none    2) Deletions:   ProAir inhaler    3) Changes: none      Pertinent Findings: none    -Clarified PTA med list with Rx Query and Patient interview. PTA medication list was corrected to the following:     Prior to Admission Medications   Prescriptions Last Dose Informant Taking? cetirizine (ZYRTEC) 10 mg tablet 2/11/2020 at Unknown time Self Yes   Sig: Take 10 mg by mouth daily as needed.       Facility-Administered Medications: None          Thank you,  Travis Paulino  PharmD Candidate Class of 1748

## 2020-03-11 NOTE — ED PROVIDER NOTES
EMERGENCY DEPARTMENT HISTORY AND PHYSICAL EXAM     ----------------------------------------------------------------------------  Please note that this dictation was completed with Evino, the computer voice recognition software. Quite often unanticipated grammatical, syntax, homophones, and other interpretive errors are inadvertently transcribed by the computer software. Please disregard these errors. Please excuse any errors that have escaped final proofreading  ----------------------------------------------------------------------------      Date: 3/10/2020  Patient Name: Ynes Tan Lower    History of Presenting Illness     Chief Complaint   Patient presents with    Syncope     patient had a syncopal episode while having a BM around 2045. patient reports he has had fever/flu like symptoms since this afternoon but [facility]    Head Injury     laceration to top of head       History Provided By:  Patient    HPI: Ashley Hoffman is a 49 y.o. male, without significant pmhx, who presents via private vehicle to the ED with c/o syncopal episode. Patient is having GI bug for last several days. Went to urgent care earlier today and tested negative for flu. Has been having ongoing abdominal cramping and diarrhea today. Was in the bathroom at home having a bowel movement when he reportedly fell. Patient was able to get up and ambulate to the bedroom without assistance. Patient's wife found him laying on the bed covered in blood. Patient notes he does not remember falling and striking his head. Denies having vision change or headache since this episode. Patient noted to have laceration to posterior aspect of head with minimal oozing. Notes his tetanus is up-to-date within the last year. No nausea or vomiting. Notes that he felt mildly dizzy earlier when having a bowel movement. Reports having taken Tylenol around 8 PM but did not take any over-the-counter medications for his diarrhea. Was not given any prescriptions at urgent care today. Patient also specifically denies any associated fevers, chills, CP, SOB, nausea, vomiting, urinary sxs, or headache. Additionally, patient notes having prostate biopsy done 1 week ago due to elevated PSA levels. Notes that he was given 2 antibiotics for the day before and day of his procedure but no subsequent antibiotics were provided. Social Hx: denies tobacco  occasional EtOH , denies Illicit Drugs    There are no other complaints, changes, or physical findings at this time. PCP: Bree Real MD    No Known Allergies    Current Facility-Administered Medications   Medication Dose Route Frequency Provider Last Rate Last Dose    acetaminophen (TYLENOL) tablet 650 mg  650 mg Oral Q6H PRN Avtar Perez MD        ondansetron [facility]) injection 4 mg  4 mg IntraVENous Q4H PRN Avtar Perez MD        levoFLOXacin (LEVAQUIN) 750 mg in D5W IVPB  750 mg IntraVENous Q24H Avtar Perez MD        metroNIDAZOLE (FLAGYL) IVPB premix 500 mg  500 mg IntraVENous Q8H Avtar Perez MD         Current Outpatient Medications   Medication Sig Dispense Refill    diphenoxylate-atropine (LOMOTIL) 2.5-0.025 mg per tablet Take 1 Tab by mouth four (4) times daily as needed for Diarrhea (1 tab after each stool for max 8 per day). Max Daily Amount: 4 Tabs. Take after each stool for a maximum of 8 tablets daily 20 Tab 0    ondansetron (ZOFRAN ODT) 4 mg disintegrating tablet Take 1 Tab by mouth every eight (8) hours as needed for Nausea. 10 Tab 0    albuterol (PROVENTIL HFA, VENTOLIN HFA, PROAIR HFA) 90 mcg/actuation inhaler Take 1 Puff by inhalation every four (4) hours as needed for Wheezing. 1 Inhaler 4    cetirizine (ZYRTEC) 10 mg tablet Take 1 tablet by mouth daily.          Past History     Past Medical History:  Past Medical History:   Diagnosis Date    Asthma     as child    Contracture of hand joint     Dupytens Contracture of both hands    Sun-damaged skin        Past Surgical History:  Past Surgical History:   Procedure Laterality Date    HX VASECTOMY         Family History:  Family History   Problem Relation Age of Onset    Cancer Father         lung    Heart Disease Paternal Grandfather     Elevated Lipids Mother     Diabetes Maternal Grandmother     Diabetes Maternal Grandfather     Alzheimer Maternal Grandfather     Cancer Daughter         neuroblastoma       Social History:  Social History     Tobacco Use    Smoking status: Never Smoker    Smokeless tobacco: Never Used   Substance Use Topics    Alcohol use: Yes     Alcohol/week: 1.7 - 2.5 standard drinks     Types: 2 - 3 Standard drinks or equivalent per week    Drug use: No       Allergies:  No Known Allergies      Review of Systems   Review of Systems   Constitutional: Positive for fever. Negative for chills. HENT: Negative. Eyes: Negative. Respiratory: Negative for cough, chest tightness and shortness of breath. Cardiovascular: Negative for chest pain and leg swelling. Gastrointestinal: Positive for diarrhea. Negative for abdominal pain, nausea and vomiting. Endocrine: Negative. Genitourinary: Negative for difficulty urinating and dysuria. Musculoskeletal: Negative for myalgias. Skin: Negative. Neurological: Negative. Psychiatric/Behavioral: Negative. All other systems reviewed and are negative. Physical Exam   Physical Exam  Vitals signs and nursing note reviewed. Constitutional:       General: He is not in acute distress. Appearance: He is well-developed. He is not diaphoretic. HENT:      Head: Normocephalic. Laceration present. Nose: Nose normal.      Mouth/Throat:      Pharynx: No oropharyngeal exudate. Eyes:      Conjunctiva/sclera: Conjunctivae normal.      Pupils: Pupils are equal, round, and reactive to light. Neck:      Musculoskeletal: Normal range of motion and neck supple. Vascular: No JVD.    Cardiovascular: Rate and Rhythm: Normal rate and regular rhythm. Heart sounds: Normal heart sounds. No murmur. No friction rub. Pulmonary:      Effort: Pulmonary effort is normal. No respiratory distress. Breath sounds: Normal breath sounds. No stridor. No wheezing or rales. Abdominal:      General: Bowel sounds are increased. There is no distension. Palpations: Abdomen is soft. Tenderness: There is no abdominal tenderness. There is no rebound. Musculoskeletal: Normal range of motion. General: No tenderness. Skin:     General: Skin is warm and dry. Findings: No rash. Neurological:      Mental Status: He is alert and oriented to person, place, and time. Cranial Nerves: No cranial nerve deficit. Psychiatric:         Speech: Speech normal.         Behavior: Behavior normal.         Thought Content: Thought content normal.         Judgment: Judgment normal.           Diagnostic Study Results     Labs -     Recent Results (from the past 12 hour(s))   CBC WITH AUTOMATED DIFF    Collection Time: 03/10/20 11:35 PM   Result Value Ref Range    WBC 7.5 4.1 - 11.1 K/uL    RBC 4.51 4.10 - 5.70 M/uL    HGB 15.0 12.1 - 17.0 g/dL    HCT 41.2 36.6 - 50.3 %    MCV 91.4 80.0 - 99.0 FL    MCH 33.3 26.0 - 34.0 PG    MCHC 36.4 30.0 - 36.5 g/dL    RDW 11.9 11.5 - 14.5 %    PLATELET 060 (L) 862 - 400 K/uL    MPV 9.5 8.9 - 12.9 FL    NRBC 0.0 0  WBC    ABSOLUTE NRBC 0.00 0.00 - 0.01 K/uL    NEUTROPHILS 94 (H) 32 - 75 %    LYMPHOCYTES 4 (L) 12 - 49 %    MONOCYTES 2 (L) 5 - 13 %    EOSINOPHILS 0 0 - 7 %    BASOPHILS 0 0 - 1 %    IMMATURE GRANULOCYTES 0 0.0 - 0.5 %    ABS. NEUTROPHILS 7.0 1.8 - 8.0 K/UL    ABS. LYMPHOCYTES 0.3 (L) 0.8 - 3.5 K/UL    ABS. MONOCYTES 0.2 0.0 - 1.0 K/UL    ABS. EOSINOPHILS 0.0 0.0 - 0.4 K/UL    ABS. BASOPHILS 0.0 0.0 - 0.1 K/UL    ABS. IMM.  GRANS. 0.0 0.00 - 0.04 K/UL    DF SMEAR SCANNED      RBC COMMENTS NORMOCYTIC, NORMOCHROMIC     METABOLIC PANEL, COMPREHENSIVE Collection Time: 03/10/20 11:35 PM   Result Value Ref Range    Sodium 137 136 - 145 mmol/L    Potassium 3.2 (L) 3.5 - 5.1 mmol/L    Chloride 104 97 - 108 mmol/L    CO2 26 21 - 32 mmol/L    Anion gap 7 5 - 15 mmol/L    Glucose 145 (H) 65 - 100 mg/dL    BUN 13 6 - 20 MG/DL    Creatinine 1.44 (H) 0.70 - 1.30 MG/DL    BUN/Creatinine ratio 9 (L) 12 - 20      GFR est AA >60 >60 ml/min/1.73m2    GFR est non-AA 51 (L) >60 ml/min/1.73m2    Calcium 8.5 8.5 - 10.1 MG/DL    Bilirubin, total 1.8 (H) 0.2 - 1.0 MG/DL    ALT (SGPT) 31 12 - 78 U/L    AST (SGOT) 26 15 - 37 U/L    Alk. phosphatase 43 (L) 45 - 117 U/L    Protein, total 6.9 6.4 - 8.2 g/dL    Albumin 3.8 3.5 - 5.0 g/dL    Globulin 3.1 2.0 - 4.0 g/dL    A-G Ratio 1.2 1.1 - 2.2     URINALYSIS W/ REFLEX CULTURE    Collection Time: 03/11/20  1:01 AM   Result Value Ref Range    Color DARK YELLOW      Appearance CLEAR CLEAR      Specific gravity 1.018 1.003 - 1.030      pH (UA) 5.5 5.0 - 8.0      Protein NEGATIVE  NEG mg/dL    Glucose NEGATIVE  NEG mg/dL    Ketone TRACE (A) NEG mg/dL    Bilirubin NEGATIVE  NEG      Blood MODERATE (A) NEG      Urobilinogen 0.2 0.2 - 1.0 EU/dL    Nitrites NEGATIVE  NEG      Leukocyte Esterase NEGATIVE  NEG      WBC 0-4 0 - 4 /hpf    RBC 5-10 0 - 5 /hpf    Epithelial cells FEW FEW /lpf    Bacteria NEGATIVE  NEG /hpf    UA:UC IF INDICATED CULTURE NOT INDICATED BY UA RESULT CNI      Hyaline cast 0-2 0 - 5 /lpf   SAMPLES BEING HELD    Collection Time: 03/11/20  2:58 AM   Result Value Ref Range    SAMPLES BEING HELD PST     COMMENT        Add-on orders for these samples will be processed based on acceptable specimen integrity and analyte stability, which may vary by analyte. HEMOGLOBIN    Collection Time: 03/11/20  5:14 AM   Result Value Ref Range    HGB 12.1 12.1 - 17.0 g/dL       Radiologic Studies -   CT ABD PELV W CONT   Final Result   IMPRESSION:   Diffuse colitis which is likely infectious or inflammatory.       XR CHEST PA LAT   Final Result IMPRESSION:   NORMAL CHEST. CT HEAD WO CONT   Final Result   IMPRESSION: No acute findings. CT Results  (Last 48 hours)               03/11/20 0621  CT ABD PELV W CONT Final result    Impression:  IMPRESSION:   Diffuse colitis which is likely infectious or inflammatory. Narrative:  EXAM: CT ABD PELV W CONT       INDICATION: diarrhea, hypotension       COMPARISON: None        CONTRAST: 100 mL of Isovue-370. TECHNIQUE:    Following the uneventful intravenous administration of contrast, thin axial   images were obtained through the abdomen and pelvis. Coronal and sagittal   reconstructions were generated. Oral contrast was not administered. CT dose   reduction was achieved through use of a standardized protocol tailored for this   examination and automatic exposure control for dose modulation. FINDINGS:    LUNG BASES: Bibasilar hypoventilatory change. INCIDENTALLY IMAGED HEART AND MEDIASTINUM: Unremarkable. LIVER: No mass or biliary dilatation. GALLBLADDER: Unremarkable. SPLEEN: No mass. PANCREAS: No mass or ductal dilatation. ADRENALS: Unremarkable. KIDNEYS: No mass, calculus, or hydronephrosis. STOMACH: Unremarkable. SMALL BOWEL: No dilatation or wall thickening. COLON: Significant thickening of the colon. APPENDIX: Unremarkable. PERITONEUM: No ascites or pneumoperitoneum. RETROPERITONEUM: No lymphadenopathy or aortic aneurysm. REPRODUCTIVE ORGANS: Normal.   URINARY BLADDER: No mass or calculus. BONES: No destructive bone lesion. ADDITIONAL COMMENTS: N/A           03/10/20 1283  CT HEAD WO CONT Final result    Impression:  IMPRESSION: No acute findings. Narrative:  EXAM: CT HEAD WO CONT       INDICATION: fall, scalp lac       COMPARISON: None. CONTRAST: None. TECHNIQUE: Unenhanced CT of the head was performed using 5 mm images. Brain and   bone windows were generated.   CT dose reduction was achieved through use of a   standardized protocol tailored for this examination and automatic exposure   control for dose modulation. FINDINGS:   The ventricles and sulci are normal in size, shape and configuration and   midline. There is no significant white matter disease. There is no intracranial   hemorrhage, extra-axial collection, mass, mass effect or midline shift. The   basilar cisterns are open. No acute infarct is identified. The bone windows   demonstrate no abnormalities. The visualized portions of the paranasal sinuses   and mastoid air cells are clear. There is posterior soft tissue swelling. CXR Results  (Last 48 hours)               03/11/20 0100  XR CHEST PA LAT Final result    Impression:  IMPRESSION:   NORMAL CHEST. Narrative:  History: Flulike symptoms. Frontal and lateral views of the chest show clear lungs. The heart, mediastinum   and pulmonary vasculature are normal.  The bony thorax is unremarkable. Medical Decision Making   I am the first provider for this patient. I reviewed the vital signs, available nursing notes, past medical history, past surgical history, family history and social history. Vital Signs-Reviewed the patient's vital signs.   Patient Vitals for the past 12 hrs:   Temp Pulse Resp BP SpO2   03/11/20 0715  98 19 (!) 86/57 95 %   03/11/20 0700  96 15 (!) 82/50 93 %   03/11/20 0646  98 17 (!) 82/55 95 %   03/11/20 0630  93 13 (!) 80/53 93 %   03/11/20 0621  93 17 (!) 86/60 95 %   03/11/20 0600  95 15 (!) 80/62 95 %   03/11/20 0545  97 13 (!) 83/59 94 %   03/11/20 0531  100 15 (!) 89/57 93 %   03/11/20 0515  (!) 102 16 (!) 81/56 94 %   03/11/20 0500  (!) 104 16 (!) 88/57 92 %   03/11/20 0445  99 14 (!) 88/55 94 %   03/11/20 0430  (!) 104 18 90/59 94 %   03/11/20 0418  (!) 101 19 (!) 86/59 94 %   03/11/20 0415  99 16 (!) 86/60 93 %   03/11/20 0406  99 17 (!) 85/56 94 %   03/11/20 0400  96 17 (!) 89/61 96 % 03/11/20 0346  85 14 (!) 88/66 99 %   03/11/20 0330  94 16 (!) 86/59 98 %   03/11/20 0324  94 19 (!) 86/60 96 %   03/11/20 0320  94 21 (!) 72/49 98 %   03/11/20 0317  94 18 (!) 69/47 97 %   03/11/20 0315  94 19 (!) 67/45 95 %   03/11/20 0300  91 12 (!) 85/57 98 %   03/11/20 0254  92 18 (!) 82/67 99 %   03/11/20 0245    (!) 78/55 99 %   03/11/20 0232    (!) 87/57 99 %   03/11/20 0200    (!) 87/58 97 %   03/11/20 0116    91/62    03/10/20 2326 99.7 °F (37.6 °C) 98 18 102/55 99 %       Pulse Oximetry Analysis - 99% on RA    Cardiac Monitor:   Rate: 98 bpm  Rhythm: nsr    Records Reviewed: Nursing Notes and Old Medical Records    Provider Notes (Medical Decision Making):     DDX:  Gastroenteritis, dehydration, electrolyte abnormality, syncopal episode, vagal episode, closed head injury, intracranial bleed, scalp laceration    Plan:  Labs, head CT, IV fluids, anti-diarrheal agent topical analgesic, scalp laceration repair    Impression:  colitis    ED Course:   Initial assessment performed. The patients presenting problems have been discussed, and they are in agreement with the care plan formulated and outlined with them. I have encouraged them to ask questions as they arise throughout their visit. I reviewed our electronic medical record system for any past medical records that were available that may contribute to the patients current condition, the nursing notes and and vital signs from today's visit    Nursing notes will be reviewed as they become available in realtime while the pt has been in the ED. Colin Contreras MD      I personally reviewed pt's imaging. Official read by radiology noted above. Colin Contreras MD    PROGRESS NOTE:  2:20 AM  Pt reportedly had additional syncopal episode while attempting to have bowel movement in the restroom. Patient denies having struck his head. Noted to have low blood pressure on return to the bathroom.   Will continue with IV fluid hydration. Hector Lucero MD    Procedure Note - Wound Repair:    Performed by Hector Lucero MD .     Immediately prior to the procedure, the patient was reevaluated and found suitable for the planned procedure and any planned medications. Immediately prior to the procedure a time out was called to verify the correct patient, procedure, equipment, staff, and marking as appropriate. Site prepped with ChloraPrep and Betadine. Anesthesia was obtained via topical application with LET. Wound irrigated with copious amounts of normal saline and explored. Wound was located on the posterior scalp, measured 7 cm and was linear. Level of complexity was: simple. Wound was closed using 7 staples. Foreign body was not suspected. Foreign body was not found. Procedure was tolerated well. PROGRESS NOTE  4:24 AM  Patient has had persistent hypotension with good maps (over 65) and no evidence of tachycardia during his hospitalization. Patient is received several liters of IV fluids without much improvement. Patient has not had need to urinate during this rehydration time period. Will repeat hemoglobin and likely plan for CT scan if pressure does not dramatically improve in the next hour. PROGRESS NOTE  6:26 AM  Patient noted to have colitis on CT scan. Remains hypotensive with continued maps above 65 and normal heart rate. Remains afebrile. Patient notes feeling mildly improved but tired. Discussed my concerns for of his colitis findings on CT and persistent hypotension despite copious amounts of IV rehydration. Will plan for admission with hospitalist service. CONSULT NOTE:   7:20 AM  Hector Lucero MD spoke with Dr. Lino Merida,   Specialty: Anastasiia Gravely Dr. Lino Merida due to persistent hypotension and colitis. Discussed pt's HPI and available diagnostic results thus far. Expressed concerns for needed admission. Consultant will evaluate for admission.   Hector Lucero MD      ADMISSION NOTE:  7:20 AM  Patient is being admitted to the hospital by Dr. Suha Portillo. The results of their tests and reasons for their admission have been discussed with them and/or available family. They convey agreement and understanding for the need to be admitted and for their admission diagnosis. Micha Camacho MD    PROGRESS NOTE  7:38 AM  Annual blood pressures performed on patient noting to be 112/72. Will hold off on central line placement at this time       Critical Care Time:       CRITICAL CARE NOTE:  7:20 AM  IMPENDING DETERIORATION -Airway, Respiratory, CNS, Metabolic and Renal  ASSOCIATED RISK FACTORS - Hypotension, Dysrhythmia, Metabolic changes, Dehydration, Vascular Compromise and CNS Decompensation  MANAGEMENT- Bedside Assessment and Supervision of Care  INTERPRETATION -  Xrays, CT Scan, ECG, Blood Pressure and Cardiac Output Measures   INTERVENTIONS - hemodynamic mngmt, vascular control, Neurologic interventions  and Metobolic interventions  CASE REVIEW - Hospitalist, Nursing and Family  TREATMENT RESPONSE -Improved  PERFORMED BY - Self  NOTES   :  I have spent 200 minutes of critical care time involved in lab review, consultations with specialist, family decision- making, bedside attention and documentation excluding time spent on any separately billed procedures. During this entire length of time I was immediately available to the patient . Micha Camacho MD        Diagnosis     Clinical Impression:   1. Colitis    2. Syncope and collapse    3. Laceration of scalp, initial encounter    4. Dehydration    5. Diarrhea, unspecified type    6. Hypotension due to hypovolemia        PLAN:  1. Admit to hospitalist service          This note will not be viewable in [address]

## 2020-03-12 LAB
ANION GAP SERPL CALC-SCNC: 5 MMOL/L (ref 5–15)
BUN SERPL-MCNC: 8 MG/DL (ref 6–20)
BUN/CREAT SERPL: 8 (ref 12–20)
C DIFF GDH STL QL: POSITIVE
C DIFF TOX A+B STL QL IA: POSITIVE
CALCIUM SERPL-MCNC: 7 MG/DL (ref 8.5–10.1)
CHLORIDE SERPL-SCNC: 114 MMOL/L (ref 97–108)
CO2 SERPL-SCNC: 22 MMOL/L (ref 21–32)
CREAT SERPL-MCNC: 0.95 MG/DL (ref 0.7–1.3)
GLUCOSE SERPL-MCNC: 78 MG/DL (ref 65–100)
INTERPRETATION: ABNORMAL
MAGNESIUM SERPL-MCNC: 1.6 MG/DL (ref 1.6–2.4)
POTASSIUM SERPL-SCNC: 3.5 MMOL/L (ref 3.5–5.1)
SODIUM SERPL-SCNC: 141 MMOL/L (ref 136–145)
WBC #/AREA STL HPF: >20 /HPF (ref 0–4)

## 2020-03-12 PROCEDURE — 74011250637 HC RX REV CODE- 250/637: Performed by: HOSPITALIST

## 2020-03-12 PROCEDURE — 74011250636 HC RX REV CODE- 250/636: Performed by: EMERGENCY MEDICINE

## 2020-03-12 PROCEDURE — 74011250636 HC RX REV CODE- 250/636: Performed by: HOSPITALIST

## 2020-03-12 PROCEDURE — 36415 COLL VENOUS BLD VENIPUNCTURE: CPT

## 2020-03-12 PROCEDURE — 65660000000 HC RM CCU STEPDOWN

## 2020-03-12 PROCEDURE — 83735 ASSAY OF MAGNESIUM: CPT

## 2020-03-12 PROCEDURE — 80048 BASIC METABOLIC PNL TOTAL CA: CPT

## 2020-03-12 PROCEDURE — 74011250637 HC RX REV CODE- 250/637: Performed by: EMERGENCY MEDICINE

## 2020-03-12 RX ADMIN — ACETAMINOPHEN 650 MG: 325 TABLET ORAL at 21:10

## 2020-03-12 RX ADMIN — SODIUM CHLORIDE AND POTASSIUM CHLORIDE: 9; 1.49 INJECTION, SOLUTION INTRAVENOUS at 18:34

## 2020-03-12 RX ADMIN — SODIUM CHLORIDE AND POTASSIUM CHLORIDE: 9; 1.49 INJECTION, SOLUTION INTRAVENOUS at 12:38

## 2020-03-12 RX ADMIN — VANCOMYCIN HYDROCHLORIDE 125 MG: KIT at 00:46

## 2020-03-12 RX ADMIN — VANCOMYCIN HYDROCHLORIDE 125 MG: KIT at 12:37

## 2020-03-12 RX ADMIN — METRONIDAZOLE 500 MG: 500 INJECTION, SOLUTION INTRAVENOUS at 08:40

## 2020-03-12 RX ADMIN — VANCOMYCIN HYDROCHLORIDE 125 MG: KIT at 18:54

## 2020-03-12 RX ADMIN — METRONIDAZOLE 500 MG: 500 INJECTION, SOLUTION INTRAVENOUS at 21:10

## 2020-03-12 RX ADMIN — LEVOFLOXACIN 750 MG: 5 INJECTION, SOLUTION INTRAVENOUS at 08:57

## 2020-03-12 RX ADMIN — VANCOMYCIN HYDROCHLORIDE 125 MG: KIT at 06:01

## 2020-03-12 RX ADMIN — ACETAMINOPHEN 650 MG: 325 TABLET ORAL at 10:54

## 2020-03-12 NOTE — PROGRESS NOTES
Hospitalist Progress Note    NAME: Oneyda  Lower   :  1965   MRN:  [id number]       Assessment / Plan:   Sever C diff, Colitis- Elevated,  Cr   Pancolitis with diarrhea, POA  Syncope due to hypotension  Scalp laceration, POA. 6 staples placed in the ER. Tetanus vaccine given a year ago  Hypotension from dehydration and colitis. Mild PILLO creatinine of 1.44, baseline 1.0 2018  Hypokalemia due to diarrhea  Recent prostate biopsy a week ago     CONTINUE stepdown.    - DC  LEVAQUIN AND CONTINUE Flagyl for.   - CONTINUE vancomycin for C. difficile. -GI consulted and   -regular food /  --remove scalp sutures in 1 week (3/18) from today. Body mass index is 22.31 kg/m².: 18.5 - 24.9 Normal weight    Code status: Full  Prophylaxis: Lovenox  Recommended Disposition: Home w/Family     Subjective:     Chief Complaint / Reason for Physician Visit  \"Sill having diarrhea and Feels weak \". Discussed with RN events overnight. Review of Systems:  Symptom Y/N Comments  Symptom Y/N Comments   Fever/Chills    Chest Pain     Poor Appetite    Edema     Cough    Abdominal Pain     Sputum    Joint Pain     SOB/GIL    Pruritis/Rash     Nausea/vomit    Tolerating PT/OT     Diarrhea    Tolerating Diet     Constipation    Other       Could NOT obtain due to:      Objective:     VITALS:   Last 24hrs VS reviewed since prior progress note.  Most recent are:  Patient Vitals for the past 24 hrs:   Temp Pulse Resp BP SpO2   20 1513 98.6 °F (37 °C) 65 16 95/64 96 %   20 1040 98.9 °F (37.2 °C) 70 16 102/68    20 0742 98.8 °F (37.1 °C) 80 14 106/67 96 %   20 0715     96 %   20 0600    101/62    20 0411 98 °F (36.7 °C) 81 14 94/59 99 %   20 2300 99.2 °F (37.3 °C) 85 16 105/60 98 %   20 2130    100/59    20 2100    98/60    20 2030    91/57    20    (!) 89/56    20 1900 99.2 °F (37.3 °C) 90 18 94/60 97 %       Intake/Output Summary (Last 24 hours) at 3/12/2020 1650  Last data filed at 3/12/2020 1545  Gross per 24 hour   Intake 4836.65 ml   Output 1500 ml   Net 3336.65 ml        PHYSICAL EXAM:  General: WD, WN. Alert, cooperative, no acute distress    EENT:  EOMI. Anicteric sclerae. MMM  Resp:  CTA bilaterally, no wheezing or rales. No accessory muscle use  CV:  Regular  rhythm,  No edema  GI:  Soft, Non distended, Non tender.  +Bowel sounds  Neurologic:  Alert and oriented X 3, normal speech,   Psych:   Good insight. Not anxious nor agitated  Skin:  No rashes. No jaundice    Reviewed most current lab test results and cultures  YES  Reviewed most current radiology test results   YES  Review and summation of old records today    NO  Reviewed patient's current orders and MAR    YES  PMH/ reviewed - no change compared to H&P  ________________________________________________________________________  Care Plan discussed with:    Comments   Patient     Family      RN     Care Manager     Consultant                        Multidiciplinary team rounds were held today with , nursing, pharmacist and clinical coordinator. Patient's plan of care was discussed; medications were reviewed and discharge planning was addressed. ________________________________________________________________________  Total NON critical care TIME:  25   Minutes    Total CRITICAL CARE TIME Spent:   Minutes non procedure based      Comments   >50% of visit spent in counseling and coordination of care     ________________________________________________________________________  Zeke Lion MD     Procedures: see electronic medical records for all procedures/Xrays and details which were not copied into this note but were reviewed prior to creation of Plan. LABS:  I reviewed today's most current labs and imaging studies.   Pertinent labs include:  Recent Labs     03/11/20  0514 03/10/20  2335   WBC  --  7.5   HGB 12.1 15.0   HCT  --  41.2   PLT --  149*     Recent Labs     03/12/20  0418 03/11/20  0941 03/10/20  2335    139 137   K 3.5 3.6 3.2*   * 110* 104   CO2 22 21 26   GLU 78 134* 145*   BUN 8 10 13   CREA 0.95 1.15 1.44*   CA 7.0* 6.5* 8.5   MG 1.6  --   --    ALB  --  2.8* 3.8   TBILI  --  1.5* 1.8*   SGOT  --  19 26   ALT  --  25 31       Signed: Hitesh Bailey MD

## 2020-03-12 NOTE — PROGRESS NOTES
Problem: Risk for Spread of Infection  Goal: Prevent transmission of infectious organism to others  Description: Prevent the transmission of infectious organisms to other patients, staff members, and visitors. Outcome: Progressing Towards Goal     Problem: Patient Education:  Go to Education Activity  Goal: Patient/Family Education  Outcome: Progressing Towards Goal     Problem: Falls - Risk of  Goal: *Absence of Falls  Description: Document Jaziel Dial Fall Risk and appropriate interventions in the flowsheet.   Outcome: Progressing Towards Goal  Note: Fall Risk Interventions:                      History of Falls Interventions: Bed/chair exit alarm, Door open when patient unattended, Evaluate medications/consider consulting pharmacy, Investigate reason for fall, Room close to nurse's station         Problem: Patient Education: Go to Patient Education Activity  Goal: Patient/Family Education  Outcome: Progressing Towards Goal     Problem: Hypotension  Goal: *Blood pressure within specified parameters  Outcome: Progressing Towards Goal     Problem: Hypotension  Goal: *Blood pressure within specified parameters  Outcome: Progressing Towards Goal  Goal: *Fluid volume balance  Outcome: Progressing Towards Goal  Goal: *Labs within defined limits  Outcome: Progressing Towards Goal     Problem: Patient Education: Go to Patient Education Activity  Goal: Patient/Family Education  Outcome: Progressing Towards Goal     Problem: General Infection Care Plan (Adult and Pediatric)  Goal: Improvement in signs and symptoms of infection  Outcome: Progressing Towards Goal  Goal: *Optimize nutritional status  Outcome: Progressing Towards Goal     Problem: Patient Education: Go to Patient Education Activity  Goal: Patient/Family Education  Outcome: Progressing Towards Goal

## 2020-03-12 NOTE — PROGRESS NOTES
Reason for Admission: Colitis                     RUR Score:  9%                 Plan for utilizing home health: Pt has no plans to utilize [facility] [place] at this time. PCP: First and Last name: None/ Marco A Torres    Name of Practice:    Are you a current patient: No    Approximate date of last visit: Pt has not been seen by this physician but has an appt in May. Current Advanced Directive/Advance Care Plan: Pt is a full code and states that he has an ACP that his wife will bring in for hospital records. Transition of Care Plan:   CM spoke with pt to complete initial assessment. CM verified pt demographic, insurance, and PCP information. Pt resides in a two story home with his wife and 2 college aged daughters. Pt is independent with ADLs/IADLs and also drives. Pt reports that he does not have a PCP at this time but plans to be seen by Dr. Lanette Pantoja in May. [facility] [place] [facility]. Plan at this time is for pt to discharge home with family. CM will arrange for PCP appt. to be in March instead of May, prior to discharge. Pt spouse will transport him home at discharge. CM will continue to follow patient for discharge planning needs and arrange for services as deemed necessary. Care Management Interventions  PCP Verified by CM: Yes  Mode of Transport at Discharge:  Other (see comment)  Transition of Care Consult (CM Consult): Discharge Planning  Current Support Network: Lives with Spouse, Own Home  Discharge Location  Discharge Placement: Home      Valerie Bustamante Care Manager  [phone]

## 2020-03-12 NOTE — PROGRESS NOTES
GI Progress Note (Luis Alfredo)  NAME:Hao Hu :1965 [id number]:[id number]   ATTG: Dr. Andrei Coronado  PCP: None  Date/Time:  3/12/2020 8:24 AM     Primary GI: Dr. Jaime Merchant    Reason for following: colitis    Assessment:   · Diffuse colitis on CT, recent abx, most likely c.diff or other infectious etiology  · Diarrhea  · Hematochezia, likely 2/2 prostate biopsy  · LOC 2/2 dehydration, scalp laceration     Plan:   · Await stool testing  · Continue empiric abx until resulted, hopefully can narrow then and/or transition to PO  · Needs a colonoscopy, after recovery in outpatient setting, which he's agreeable to set up     Subjective:   Discussed with RN events overnight. BP was \"soft\" and SBP 90s-100, but improved on manual cuff when taken on that. He's feeling better. Ambulating with minimal symptoms. Abd is mostly back to normal.      Review of Systems:  Symptom Y/N Comments  Symptom Y/N Comments   Fever/Chills n   Chest Pain n    Cough n   Headaches n    Sputum n   Joint Pain n    SOB/GIL n   Pruritis/Rash n    Tolerating Diet y   Other       Could NOT obtain due to:      Objective:   VITALS:   Last 24hrs VS reviewed since prior progress note. Most recent are:  Visit Vitals  /67 (BP 1 Location: Right arm, BP Patient Position: At rest)   Pulse 80   Temp 98.8 °F (37.1 °C)   Resp 14   Ht 6' (1.829 m)   Wt 74.6 kg (164 lb 7.4 oz)   SpO2 96%   BMI 22.31 kg/m²       Intake/Output Summary (Last 24 hours) at 3/12/2020 0824  Last data filed at 3/12/2020 0411  Gross per 24 hour   Intake 4073.32 ml   Output 450 ml   Net 3623.32 ml     PHYSICAL EXAM:  General: WD, WN. Alert, cooperative, no acute distress    HEENT: NC, Atraumatic. Anicteric sclerae. Lungs:  CTA Bilaterally. No Wheezing/Rhonchi/Rales. Heart:  Regular  rhythm,  No murmur (), No Rubs, No Gallops  Abdomen: Soft, Non distended, Non tender.  +Bowel sounds, no HSM  Extremities: No c/c/e  Neurologic:  Alert and oriented X 3.   No acute neurological distress Psych:   Good insight. Not anxious nor agitated. Lab and Radiology Data Reviewed: (see below)    Medications Reviewed: (see below)  PMH/SH reviewed - no change compared to H&P  ________________________________________________________________________  Total time spent with patient: 15 minutes ________________________________________________________________________  Care Plan discussed with:  Patient x   Family     RN x              Consultant:       Danii Lozoya MD     Procedures: see electronic medical records for all procedures/Xrays and details which were not copied into this note but were reviewed prior to creation of Plan. LABS:  Recent Labs     03/11/20  0514 03/10/20  2335   WBC  --  7.5   HGB 12.1 15.0   HCT  --  41.2   PLT  --  149*     Recent Labs     03/12/20  0418 03/11/20  0941 03/10/20  2335    139 137   K 3.5 3.6 3.2*   * 110* 104   CO2 22 21 26   BUN 8 10 13   CREA 0.95 1.15 1.44*   GLU 78 134* 145*   CA 7.0* 6.5* 8.5   MG 1.6  --   --      Recent Labs     03/11/20  0941 03/10/20  2335   SGOT 19 26   AP 30* 43*   TP 5.2* 6.9   ALB 2.8* 3.8   GLOB 2.4 3.1     No results for input(s): INR, PTP, APTT, INREXT in the last 72 hours. No results for input(s): FE, TIBC, PSAT, FERR in the last 72 hours. No results found for: FOL, RBCF  No results for input(s): PH, PCO2, PO2 in the last 72 hours. No results for input(s): CPK, CKMB in the last 72 hours.     No lab exists for component: TROPONINI  Lab Results   Component Value Date/Time    Color DARK YELLOW 03/11/2020 01:01 AM    Appearance CLEAR 03/11/2020 01:01 AM    Specific gravity 1.018 03/11/2020 01:01 AM    pH (UA) 5.5 03/11/2020 01:01 AM    Protein NEGATIVE  03/11/2020 01:01 AM    Glucose NEGATIVE  03/11/2020 01:01 AM    Ketone TRACE (A) 03/11/2020 01:01 AM    Bilirubin NEGATIVE  03/11/2020 01:01 AM    Urobilinogen 0.2 03/11/2020 01:01 AM    Nitrites NEGATIVE  03/11/2020 01:01 AM    Leukocyte Esterase NEGATIVE  03/11/2020 01:01 AM    Epithelial cells FEW 03/11/2020 01:01 AM    Bacteria NEGATIVE  03/11/2020 01:01 AM    WBC 0-4 03/11/2020 01:01 AM    RBC 5-10 03/11/2020 01:01 AM       MEDICATIONS:  Current Facility-Administered Medications   Medication Dose Route Frequency    acetaminophen (TYLENOL) tablet 650 mg  650 mg Oral Q6H PRN    levoFLOXacin (LEVAQUIN) 750 mg in D5W IVPB  750 mg IntraVENous Q24H    metroNIDAZOLE (FLAGYL) IVPB premix 500 mg  500 mg IntraVENous Q12H    vancomycin (FIRVANQ) 50 mg/mL oral solution 125 mg  125 mg Oral Q6H    ondansetron (ZOFRAN) injection 4 mg  4 mg IntraVENous Q6H PRN    0.9% sodium chloride with KCl 20 mEq/L infusion   IntraVENous CONTINUOUS

## 2020-03-12 NOTE — PROGRESS NOTES
Problem: Risk for Spread of Infection  Goal: Prevent transmission of infectious organism to others  Description: Prevent the transmission of infectious organisms to other patients, staff members, and visitors.   Outcome: Progressing Towards Goal     Problem: Patient Education:  Go to Education Activity  Goal: Patient/Family Education  Outcome: Progressing Towards Goal     Problem: Patient Education: Go to Patient Education Activity  Goal: Patient/Family Education  Outcome: Progressing Towards Goal     Problem: Hypotension  Goal: *Blood pressure within specified parameters  Outcome: Progressing Towards Goal     Problem: Hypotension  Goal: *Blood pressure within specified parameters  Outcome: Progressing Towards Goal  Goal: *Fluid volume balance  Outcome: Progressing Towards Goal  Goal: *Labs within defined limits  Outcome: Progressing Towards Goal     Problem: General Infection Care Plan (Adult and Pediatric)  Goal: Improvement in signs and symptoms of infection  Outcome: Progressing Towards Goal  Goal: *Optimize nutritional status  Outcome: Progressing Towards Goal

## 2020-03-12 NOTE — PROGRESS NOTES
0700: Bedside shift change report given to Josh (oncoming nurse) by Josephine Kirkpatrick (offgoing nurse). Report included the following information SBAR, Kardex, MAR and Recent Results. 1031: Provider at bedside. 1100: Per IDRs, pt diet advanced to GI light. Order acknowledged. 1200:  Family at bedside. 1900:  Bedside shift change report given to [address] [place] [address]  (oncoming nurse) by Chano Ospina (offgoing nurse). Report included the following information SBAR, Kardex, Intake/Output, MAR and Recent Results.

## 2020-03-13 ENCOUNTER — TELEPHONE (OUTPATIENT)
Dept: INTERNAL MEDICINE CLINIC | Age: 55
End: 2020-03-13

## 2020-03-13 VITALS
HEART RATE: 65 BPM | OXYGEN SATURATION: 98 % | RESPIRATION RATE: 18 BRPM | WEIGHT: 164.46 LBS | BODY MASS INDEX: 22.28 KG/M2 | HEIGHT: 72 IN | TEMPERATURE: 97.4 F | DIASTOLIC BLOOD PRESSURE: 79 MMHG | SYSTOLIC BLOOD PRESSURE: 113 MMHG

## 2020-03-13 LAB
ALBUMIN SERPL-MCNC: 2.6 G/DL (ref 3.5–5)
ALBUMIN/GLOB SERPL: 0.9 {RATIO} (ref 1.1–2.2)
ALP SERPL-CCNC: 42 U/L (ref 45–117)
ALT SERPL-CCNC: 21 U/L (ref 12–78)
ANION GAP SERPL CALC-SCNC: 8 MMOL/L (ref 5–15)
AST SERPL-CCNC: 17 U/L (ref 15–37)
BASOPHILS # BLD: 0 K/UL (ref 0–0.1)
BASOPHILS NFR BLD: 0 % (ref 0–1)
BILIRUB SERPL-MCNC: 0.9 MG/DL (ref 0.2–1)
BUN SERPL-MCNC: 5 MG/DL (ref 6–20)
BUN/CREAT SERPL: 6 (ref 12–20)
CALCIUM SERPL-MCNC: 7.3 MG/DL (ref 8.5–10.1)
CHLORIDE SERPL-SCNC: 113 MMOL/L (ref 97–108)
CO2 SERPL-SCNC: 21 MMOL/L (ref 21–32)
CREAT SERPL-MCNC: 0.8 MG/DL (ref 0.7–1.3)
DIFFERENTIAL METHOD BLD: ABNORMAL
EOSINOPHIL # BLD: 0.3 K/UL (ref 0–0.4)
EOSINOPHIL NFR BLD: 3 % (ref 0–7)
ERYTHROCYTE [DISTWIDTH] IN BLOOD BY AUTOMATED COUNT: 12.4 % (ref 11.5–14.5)
GLOBULIN SER CALC-MCNC: 2.8 G/DL (ref 2–4)
GLUCOSE SERPL-MCNC: 80 MG/DL (ref 65–100)
HCT VFR BLD AUTO: 34.1 % (ref 36.6–50.3)
HGB BLD-MCNC: 11.8 G/DL (ref 12.1–17)
IMM GRANULOCYTES # BLD AUTO: 0.1 K/UL (ref 0–0.04)
IMM GRANULOCYTES NFR BLD AUTO: 1 % (ref 0–0.5)
LYMPHOCYTES # BLD: 1.3 K/UL (ref 0.8–3.5)
LYMPHOCYTES NFR BLD: 16 % (ref 12–49)
MCH RBC QN AUTO: 32.6 PG (ref 26–34)
MCHC RBC AUTO-ENTMCNC: 34.6 G/DL (ref 30–36.5)
MCV RBC AUTO: 94.2 FL (ref 80–99)
MONOCYTES # BLD: 0.5 K/UL (ref 0–1)
MONOCYTES NFR BLD: 6 % (ref 5–13)
NEUTS SEG # BLD: 6.3 K/UL (ref 1.8–8)
NEUTS SEG NFR BLD: 74 % (ref 32–75)
NRBC # BLD: 0 K/UL (ref 0–0.01)
NRBC BLD-RTO: 0 PER 100 WBC
PLATELET # BLD AUTO: 139 K/UL (ref 150–400)
PMV BLD AUTO: 10.1 FL (ref 8.9–12.9)
POTASSIUM SERPL-SCNC: 3.7 MMOL/L (ref 3.5–5.1)
PROT SERPL-MCNC: 5.4 G/DL (ref 6.4–8.2)
RBC # BLD AUTO: 3.62 M/UL (ref 4.1–5.7)
SODIUM SERPL-SCNC: 142 MMOL/L (ref 136–145)
WBC # BLD AUTO: 8.5 K/UL (ref 4.1–11.1)

## 2020-03-13 PROCEDURE — 85025 COMPLETE CBC W/AUTO DIFF WBC: CPT

## 2020-03-13 PROCEDURE — 80053 COMPREHEN METABOLIC PANEL: CPT

## 2020-03-13 PROCEDURE — 36415 COLL VENOUS BLD VENIPUNCTURE: CPT

## 2020-03-13 PROCEDURE — 74011250636 HC RX REV CODE- 250/636: Performed by: HOSPITALIST

## 2020-03-13 PROCEDURE — 74011250637 HC RX REV CODE- 250/637: Performed by: HOSPITALIST

## 2020-03-13 RX ORDER — VANCOMYCIN HYDROCHLORIDE 250 MG/5ML
125 POWDER, FOR SOLUTION ORAL EVERY 6 HOURS
Qty: 100 ML | Refills: 0 | Status: SHIPPED | OUTPATIENT
Start: 2020-03-13 | End: 2020-03-23

## 2020-03-13 RX ADMIN — SODIUM CHLORIDE AND POTASSIUM CHLORIDE: 9; 1.49 INJECTION, SOLUTION INTRAVENOUS at 00:35

## 2020-03-13 RX ADMIN — SODIUM CHLORIDE AND POTASSIUM CHLORIDE: 9; 1.49 INJECTION, SOLUTION INTRAVENOUS at 05:32

## 2020-03-13 RX ADMIN — METRONIDAZOLE 500 MG: 500 INJECTION, SOLUTION INTRAVENOUS at 08:29

## 2020-03-13 RX ADMIN — VANCOMYCIN HYDROCHLORIDE 125 MG: KIT at 05:30

## 2020-03-13 RX ADMIN — VANCOMYCIN HYDROCHLORIDE 125 MG: KIT at 11:58

## 2020-03-13 RX ADMIN — VANCOMYCIN HYDROCHLORIDE 125 MG: KIT at 00:24

## 2020-03-13 NOTE — PROGRESS NOTES
GI PROGRESS NOTE  Vera Manning, KIARA  [phone] NP in-hospital cell phone M-F until 4:30  After 5pm or on weekends, please call  for physician on call    NAME:Hao Hu :1965 [id number]   ATTG: Dr. Cherry Mata  PCP: None  Date/Time:  3/13/2020 1878 AM    Primary GI: Dr. Nidia Velez    Reason for following: colitis    Assessment:   · Diffuse colitis on CT secondary C. Diff colitis  - Recent antibiotics s/p prostate biopsy  · Diarrhea  - 3 BMs overnight but now having control  · Hematochezia, likely 2/2 prostate biopsy - resolved  · LOC 2/2 dehydration, scalp laceration - staples in place     Plan:   · Vancomycin 125mg every 6 hours x 14 days  · Transition to PO Flagyl 500mg every 8 hours x 14 days  · Pt will need an outpatient colonoscopy - will set this up in about 4 weeks - discussed that if diarrhea is not resolved - will need to delay colonoscopy. Pt stable enough for discharged from GI standpoint. Nothing further to add at this time. Will sign off. Plan discussed with Dr Nidia Velez. Subjective:   Discussed with RN events overnight. Doing well. Tolerating food. No abdominal pain. More energy. Review of Systems:  Symptom Y/N Comments  Symptom Y/N Comments   Fever/Chills n   Chest Pain n    Cough n   Headaches n    Sputum n   Joint Pain n    SOB/GIL n   Pruritis/Rash n    Tolerating Diet y Gi lite  Other       Could NOT obtain due to:      Objective:   VITALS:   Last 24hrs VS reviewed since prior progress note. Most recent are:  Visit Vitals  /79   Pulse 65   Temp 97.4 °F (36.3 °C)   Resp 18   Ht 6' (1.829 m)   Wt 74.6 kg (164 lb 7.4 oz)   SpO2 98%   BMI 22.31 kg/m²       Intake/Output Summary (Last 24 hours) at 3/13/2020 0918  Last data filed at 3/13/2020 0430  Gross per 24 hour   Intake 1203.33 ml   Output 2050 ml   Net -846.67 ml     PHYSICAL EXAM:  General: WD, WN. Alert, cooperative, no acute distress    HEENT: NC, Atraumatic. Anicteric sclerae.   Lungs:  CTA Bilaterally. No Wheezing/Rhonchi/Rales. Heart:  Regular  rhythm,  No murmur (-), No Rubs, No Gallops  Abdomen: Soft, Non distended, Non tender.  +Bowel sounds, no HSM  Extremities: No c/c/e  Neurologic:  Alert and oriented X 3. No acute neurological distress   Psych:   Good insight. Not anxious nor agitated. Lab and Radiology Data Reviewed: (see below)    Medications Reviewed: (see below)  PMH/SH reviewed - no change compared to H&P  ________________________________________________________________________  Total time spent with patient: 20 minutes ________________________________________________________________________  Care Plan discussed with:  Patient y   Family     RN y              Consultant: Rosalind Bhandari NP     Procedures: see electronic medical records for all procedures/Xrays and details which were not copied into this note but were reviewed prior to creation of Plan. LABS:  Recent Labs     03/13/20 0407 03/11/20  0514 03/10/20  2335   WBC 8.5  --  7.5   HGB 11.8* 12.1 15.0   HCT 34.1*  --  41.2   *  --  149*     Recent Labs     03/13/20  0407 03/12/20  0418 03/11/20  0941    141 139   K 3.7 3.5 3.6   * 114* 110*   CO2 21 22 21   BUN 5* 8 10   CREA 0.80 0.95 1.15   GLU 80 78 134*   CA 7.3* 7.0* 6.5*   MG  --  1.6  --      Recent Labs     03/13/20  0407 03/11/20  0941 03/10/20  2335   SGOT 17 19 26   AP 42* 30* 43*   TP 5.4* 5.2* 6.9   ALB 2.6* 2.8* 3.8   GLOB 2.8 2.4 3.1     No results for input(s): INR, PTP, APTT, INREXT, INREXT in the last 72 hours. No results for input(s): FE, TIBC, PSAT, FERR in the last 72 hours. No results found for: FOL, RBCF  No results for input(s): PH, PCO2, PO2 in the last 72 hours. No results for input(s): CPK, CKMB in the last 72 hours.     No lab exists for component: TROPONINI  Lab Results   Component Value Date/Time    Color DARK YELLOW 03/11/2020 01:01 AM    Appearance CLEAR 03/11/2020 01:01 AM    Specific gravity 1.018 03/11/2020 01:01 AM    pH (UA) 5.5 03/11/2020 01:01 AM    Protein NEGATIVE  03/11/2020 01:01 AM    Glucose NEGATIVE  03/11/2020 01:01 AM    Ketone TRACE (A) 03/11/2020 01:01 AM    Bilirubin NEGATIVE  03/11/2020 01:01 AM    Urobilinogen 0.2 03/11/2020 01:01 AM    Nitrites NEGATIVE  03/11/2020 01:01 AM    Leukocyte Esterase NEGATIVE  03/11/2020 01:01 AM    Epithelial cells FEW 03/11/2020 01:01 AM    Bacteria NEGATIVE  03/11/2020 01:01 AM    WBC 0-4 03/11/2020 01:01 AM    RBC 5-10 03/11/2020 01:01 AM       MEDICATIONS:  Current Facility-Administered Medications   Medication Dose Route Frequency    acetaminophen (TYLENOL) tablet 650 mg  650 mg Oral Q6H PRN    metroNIDAZOLE (FLAGYL) IVPB premix 500 mg  500 mg IntraVENous Q12H    vancomycin (FIRVANQ) 50 mg/mL oral solution 125 mg  125 mg Oral Q6H    ondansetron (ZOFRAN) injection 4 mg  4 mg IntraVENous Q6H PRN    0.9% sodium chloride with KCl 20 mEq/L infusion   IntraVENous CONTINUOUS

## 2020-03-13 NOTE — TELEPHONE ENCOUNTER
----- Message from Crow Vieira sent at 3/13/2020 11:00 AM EDT -----  Regarding: Avinash Qiu  Contact: [phone]    Caller's first and last name and relationship to patient (if not the patient): Kaycejjsophie Silvestres, [address]  Best contact number: [phone]  Preferred date and time: within a week  Scheduled appointment date and time: 5/4/20 at 3:pm  Reason for appointment:  ED [facility] discharge  on 3/13/20 for colitis/ acute kidney injury  Details to clarify the request :n/a      Copy/paste envera

## 2020-03-13 NOTE — PROGRESS NOTES
1937 Bedside report given to Marylen Carnes, RN by Elizabeth Huynh RN. Report included SBAR, ED Report, Labs, and Cardiac Rhythm NSR. Patient in bed resting well. Vitals are stable.

## 2020-03-13 NOTE — CDMP QUERY
Dr Faisal Velasco:    Patient admitted with syncope d/t hypotension d/t diarrhea, confirmed Cdiff after further study. . Documentation reflects mild PILLO Creat 1.44, baseline 1.0 (2018) in note(s) dated 3/10 thru 3/12. This dx is not documented & confirmed in the DC summary. If possible, please specify in the progress notes and d/c summary if PILLO was:     Ruled out after study  Confirmed after study          Other finding    The medical record reflects the following:      Risk Factors: surgical procedure for prostate biopsy a wk PTA on abx (Cipro & started w a C ? Cefuroxime), diarrhea, hypotension     Clinical Indicators: sCreat 1.44 on admission, BL 1.0 (2018),  P/w  Hypotension SBP as low as 67/45-82/67  87/57 maintained for 24 hrs on admission. Meets [place] criteria:  Increase sCR   Scr to >/1.5 x increase w/in 7 d , sustained >/24 hrs. Given a total of 5L, approx. Urine output 500 ml at that time. Treatment: 5L IVF in ED followed w/ NSS @ 200 ml/hr started 3/11, follow labs, Vancomycin dosing.      Thank you,   Izzy Contreras, RN, [address] [place] [address], [address] [place] [address]   [phone]          Thank you,   Izzy Contreras, [address] [place] [address], [address] [place] [address], [address] [place] [address]   [phone]

## 2020-03-13 NOTE — ROUTINE PROCESS
The following appointments have been successfully scheduled:    Date/time Tuesday, March 17, 2020 10:00 AM  Patient  Carmen Verde 1965 (29TY M) #[id number]  Department [facility]-MAIN OFFICE-PCP  Appointment type New Patient  Provider [facility]

## 2020-03-13 NOTE — PROGRESS NOTES
0715 Bedside, Verbal and Written shift change report given to Grecia NIEVES RN (oncoming nurse) by Love Damon RN (offgoing nurse). Report included the following information SBAR, Kardex, Intake/Output, MAR, Recent Results and Med Rec Status.      1205 Activity, DC instructions and new scripts given and discussed 1200 dose of oral Vanc given Tele and IV removed, wife at lobby to      [facility] not [facility] by insurance CM asked for assistance with

## 2020-03-13 NOTE — DISCHARGE SUMMARY
Hospitalist Discharge Summary     Patient ID:  Misael Richardson  [id number]  49 y.o.  1965    PCP on record: None    Admit date: 3/10/2020  Discharge date and time: 3/13/2020      DISCHARGE DIAGNOSIS:  Syncope due to hypotension   Hypotension due to GI fluid loss as a result of diarrhea   Sever C diff colitis   PILLO resolved       CONSULTATIONS:  IP CONSULT TO HOSPITALIST  IP CONSULT TO GASTROENTEROLOGY    Excerpted HPI from H&P of Cheo Robles MD:    Misael Richardson is a 49 y.o. male no significant past medical history who was well until he developed 2 episodes of diarrhea yesterday afternoon while at work. He went to urgent care last evening and tested negative for the flu and was discharged home. At 10:15 PM while in the bathroom he felt weak and passed out hitting the back of his head and came to the emergency room because he was bleeding. CT of the head shows posterior soft tissue swelling. He had 2 episodes of diarrhea in the emergency room and had a syncopal episode while in the bathroom. He denies any chest pain shortness of breath or coughing. He had some abdominal pain and nausea but no vomiting. He denies seeing any blood in his stool. He denies any travel or sick contact with diarrheal illness. He had a prostate biopsy last Tuesday and took 3 days of Cipro and an a second antibiotic that started with a C (?cefuroxime).    In the ER his initial blood pressure was 102/55 but since 2 AM his blood pressure has been low with SBP as low as 67/45-80 7/57. Has been given a total of 5 L of IV fluids and has voided approximately 500 mL's. He has never had a colonoscopy. Denies any fevers chills or weight loss.     ______________________________________________________________________  DISCHARGE SUMMARY/HOSPITAL COURSE:  for full details see H&P, daily progress notes, labs, consult notes. Patient presented with syncope at home and laceration of his head that needs suturing.  In the ER he was found to have hypotension and dehydration with elevated creatinine. Patient was started on IV antibiotics and oral vanc for suspected C diff. Later C diff was found tp be positive. Other antibiotics were discontinued and patient was continued on vanc oral. He was given IV fluids and BP has improved. He was tolerating oral feeds and diarrhea has subsided. Patient to go home with Oral vanc to finish course  Patient also had PILLO which resolved with treatment   Patient was given 3 days of Cipro after prostate biopsy at 10 days before his admission             _______________________________________________________________________  Patient seen and examined by me on discharge day. Pertinent Findings:  Gen:    Not in distress  Chest: Clear lungs  CVS:   Regular rhythm. No edema  Abd:  Soft, not distended, not tender  Neuro:  Alert, oriented   _______________________________________________________________________  DISCHARGE MEDICATIONS:   Current Discharge Medication List      START taking these medications    Details   vancomycin (FIRVANQ) 50 mg/mL oral solution Take 2.5 mL by mouth every six (6) hours for 10 days. Qty: 100 mL, Refills: 0      diphenoxylate-atropine (LOMOTIL) 2.5-0.025 mg per tablet Take 1 Tab by mouth four (4) times daily as needed for Diarrhea (1 tab after each stool for max 8 per day). Max Daily Amount: 4 Tabs. Take after each stool for a maximum of 8 tablets daily  Qty: 20 Tab, Refills: 0    Associated Diagnoses: Diarrhea, unspecified type      ondansetron (ZOFRAN ODT) 4 mg disintegrating tablet Take 1 Tab by mouth every eight (8) hours as needed for Nausea. Qty: 10 Tab, Refills: 0         CONTINUE these medications which have NOT CHANGED    Details   cetirizine (ZYRTEC) 10 mg tablet Take 10 mg by mouth daily as needed.     Associated Diagnoses: Snoring; AR (allergic rhinitis)             My Recommended Diet, Activity, Wound Care, and follow-up labs are listed in the patient's Discharge Insturctions which I have personally completed and reviewed.     ______________________________________________________________________    Risk of deterioration: Low    Condition at Discharge:  Stable  ______________________________________________________________________    Disposition  Home with family, no needs  BSI BLANK LIST:20061::\"Home with family, no needs\",\"Home with family and home health services\",   ______________________________________________________________________    Care Plan discussed with:   Patient, Family, RN, Care Manager, Consultant    Comment: stable   ______________________________________________________________________    Code Status: Full Code  ___

## 2020-03-13 NOTE — PROGRESS NOTES
CM informed by pt nurse that [facility] does not cover po vancomycin. Pt has already discharged. CM contacted unit pharmacist for alternative options. CM will contact [facility] to verify that pt medicine is not covered. Possible solution is good RX coupon if pt can afford. CM will continue to follow patient for discharge planning needs and arrange for services as deemed necessary. UPDATE 1:50PM  CM verified that pt medication would not be covered by insurance in pill or liquid form. No [facility] within 20 mile radius has prescribed medication in stock. CM contacted [facility] and [facility] who do not have enough medication to fill for pt. CM contacted [address] [facility] and they are able to fill pt prescription (liquid form) by 3:30pm today at a cost of $50.00. Pt is agreeable to this and was provided with address for [address]. Care Management has completed the discharge planning needs of the patient at this time.        Jose Manuel Walsh, Care Manager  [phone]

## 2020-03-13 NOTE — PROGRESS NOTES
TRANSITION OF CARE PLAN:     Plan A: Home with family    CM contacted office of Dr. Horace Poole to request earlier appt for pt. Dr. Cleon Schaumann office not able to accommodate that request at this time. CM sent message to scheduling for pt to have new appt scheduled for 1 week following d/c. New PCP appt added to AVS. Pt will keep appt with Dr. Horace Poole as he would like for him to be his PCP. Pt wife will transport him home at discharge and should arrive by noon. Pt states that he has no further needs or concerns related to discharge at this time. Care Management has completed the discharge planning needs of the patient at this time. Care Management Interventions  PCP Verified by CM: Yes  Mode of Transport at Discharge:  Other (see comment)  Transition of Care Consult (CM Consult): Discharge Planning  Current Support Network: Lives with Spouse, Own Home  Discharge Location  Discharge Placement: Home      Carmen Young Care Manager  [phone]

## 2020-03-13 NOTE — PROGRESS NOTES
Problem: Risk for Spread of Infection  Goal: Prevent transmission of infectious organism to others  Description: Prevent the transmission of infectious organisms to other patients, staff members, and visitors. Outcome: Progressing Towards Goal     Problem: Patient Education:  Go to Education Activity  Goal: Patient/Family Education  Outcome: Progressing Towards Goal     Problem: Falls - Risk of  Goal: *Absence of Falls  Description: Document Edwardo Becerra Fall Risk and appropriate interventions in the flowsheet.   Outcome: Progressing Towards Goal  Note: Fall Risk Interventions:  Mobility Interventions: Patient to call before getting OOB         Medication Interventions: Assess postural VS orthostatic hypotension, Patient to call before getting OOB    Elimination Interventions: Bed/chair exit alarm, Call light in reach    History of Falls Interventions: Bed/chair exit alarm, Consult care management for discharge planning         Problem: Patient Education: Go to Patient Education Activity  Goal: Patient/Family Education  Outcome: Progressing Towards Goal     Problem: Hypotension  Goal: *Blood pressure within specified parameters  Outcome: Progressing Towards Goal     Problem: Hypotension  Goal: *Blood pressure within specified parameters  Outcome: Progressing Towards Goal  Goal: *Fluid volume balance  Outcome: Progressing Towards Goal  Goal: *Labs within defined limits  Outcome: Progressing Towards Goal     Problem: Patient Education: Go to Patient Education Activity  Goal: Patient/Family Education  Outcome: Progressing Towards Goal     Problem: General Infection Care Plan (Adult and Pediatric)  Goal: Improvement in signs and symptoms of infection  Outcome: Progressing Towards Goal  Goal: *Optimize nutritional status  Outcome: Progressing Towards Goal     Problem: Patient Education: Go to Patient Education Activity  Goal: Patient/Family Education  Outcome: Progressing Towards Goal

## 2020-03-16 LAB
BACTERIA SPEC CULT: NORMAL
O+P SPEC MICRO: NORMAL
O+P STL CONC: NORMAL
SERVICE CMNT-IMP: NORMAL
SPECIMEN SOURCE: NORMAL

## 2020-03-17 ENCOUNTER — OFFICE VISIT (OUTPATIENT)
Dept: FAMILY MEDICINE CLINIC | Age: 55
End: 2020-03-17

## 2020-03-17 VITALS
SYSTOLIC BLOOD PRESSURE: 111 MMHG | RESPIRATION RATE: 22 BRPM | TEMPERATURE: 97.6 F | OXYGEN SATURATION: 100 % | HEART RATE: 62 BPM | BODY MASS INDEX: 22.36 KG/M2 | DIASTOLIC BLOOD PRESSURE: 78 MMHG | HEIGHT: 72 IN | WEIGHT: 165.1 LBS

## 2020-03-17 DIAGNOSIS — E78.49 OTHER HYPERLIPIDEMIA: ICD-10-CM

## 2020-03-17 DIAGNOSIS — S01.01XA LACERATION OF SCALP, INITIAL ENCOUNTER: ICD-10-CM

## 2020-03-17 NOTE — PATIENT INSTRUCTIONS
1) Staples removed from head - looks really good. Don't scrub in shower. There is dried blood matted in hair, but use warm water and lightly shampoo. Have someone check after showering to make sure there is no active bleeding. If there is, put the bacitracin ointment over the spots to prevent infection. Please monitor for signs or symptoms of infection including redness, warmth, swelling, discharge and/or fever/chills. 2) Talk to your nutritionist about repopulating the gut with good bacteria (the C Diff and antibiotics creates an imbalance in good bacteria and will take awhile to restore the natural balance in your GI     Probiotics contain different types of micro-organisms such as yeast (saccharomyces boulardii) and bacteria (such as lactobacillus, bifidobacterium). Micro-organisms (kevin) are naturally found in the stomach/intestines/vagina. Some conditions (such as antibiotic use, travel) can change the normal balance of bacteria/yeast. Probiotics are used to improve digestion and restore normal kevin. Probiotics have been used to treat bowel problems (such as diarrhea, irritable bowel), eczema, vaginal yeast infections, lactose intolerance, and urinary tract infections. Probiotics are available in foods (such as yogurt, milk, juices, soy beverages) and as dietary supplements (capsules, tablets, powders). Different products have different uses. Check the label for information on uses for your particular product. Sacharomyces boulardii (OTC product) - yeast used as a probiotic to introduce good active cultures into the large and small intestines. Can be used to help prevent and treat of several gastrointestinal diseases. Florastor probiotic, 1 capsule twice daily for 5 days, ask for it at the pharmacy. 3)  Your hemoglobin  is 11.8 g/dL which is low  and referred to as anemia or low red blood count. (Estimated normal range for females: 11 -16 g/dL, males: 13- 17 g/dL).   Hemoglobin is a protein in red blood cells that carries oxygen throughout the body. Possible symptoms, include fatigue,  shortness of breath, heart palpitations, or dizziness. Causes of low hemoglobin can include pregnancy, iron deficiency, Vitamin D deficiency, chronic kidney disease, blood loss and certain cancers. An iron deficiency can cause anemia, a condition that affects your red blood cells. These cells carry oxygen from your lungs to the rest of your body. Iron is a key ingredient in hemoglobin, the protein in red blood cells that allows them to carry oxygen. The most common cause of anemia is when your body doesn't make enough hemoglobin because it doesn't have enough iron. This is called iron deficiency anemia. Low iron can cause symptoms of fatigue, shortness of breath, heart palpitations, problems concentrating, and dizziness. In 2 weeks, you may want to start over the counter iron supplement - either ferrous sulfate (325mg) or ferrous gluconate, (such as [place]) daily to help increase hemoglobin levels. (Give yourself 2 weeks of recovery from the C Diff before starting iron, as it can be hard on the GI). Iron is best absorbed if it is taken with vitamin C (for example, a glass of orange juice or citrus juice.) Increasing your consumption of foods rich in vitamin C is also beneficial when using iron supplements. Iron is found in food, such as fortified cereals and breads, red meat, beans, and green leafy vegetables. Men and post menopausal women need up to 8 mg iron daily while other women need 18 mg daily. Iron supplements may cause constipation. Some things you can do to help prevent constipation:   -Drink at least 8-10 glasses (64 oz) of water per day. Avoid caffeine as this can cause  dehydration which can contribute to constipation  -Increase fiber in your diet, making sure you are getting the recommended fluid intake. It is recommended to have 20-35 mg of fiber per day.   Fruits and vegetables are good sources of fiber. Some examples include: dates (13.5mg) apple (3.5mg); banana (2.5mg), pear (4.5mg); broccoli (5mg), carrots (4.5mg), peas (7mg). -Bulk-forming laxatives, such as Metamucil, FiberCon, Citrucel, can be used to help increase water absorption in intestines and fecal bulk. Follow directions on package for dosing instructions.  -A combination of senna (laxative) and docusate (stool softener) can help if you have not had a bowel movement in a few days. This can be found over the counter and is taken at night.   -Exercise, especially after meals, helps increase gastrointestinal movements and prevent constipation.  -When you feel the need to go to the bathroom, go, don't hold it. Your colon is most motile after a meal, so try and defecate after meals. Signs to watch for include: blood in toilet or toilet paper after a bowel movement, fever, weight loss, feeling weak. If you experience any of these symptoms, please make an office visit so this can be assessed. 3) COVID 19 (coronavirus) (as of 3/16/20)    This virus is spread in large droplets by coughing and sneezing. This virus only has cell receptors for lung cells (it only infects your lungs.) The only way for the virus to infect you is through your nose or mouth via your hands or an infected cough or sneeze onto or into your nose or mouth. This means that the air will not infect you, but if you are around someone who is shedding the virus, it is possible to become infected. All the surfaces where these droplets land are infectious for about 3-5 days on average - everything that is associated with infected people will be contaminated and potentially infectious. Symptoms of COVID 19 - fever, cough, and/or shortness of breath. Symptoms may appear 2-14 days after exposure. Tips for preventing spread of COVID 19 (coronavirus)    1) NO HANDSHAKING!  Use a fist bump, slight bow, elbow bump, etc.    2) Use ONLY your knuckle to touch light switches. elevator buttons, etc.. Lift the gasoline dispenser with a paper towel or use a disposable glove. 3) Open doors with your closed fist or hip - do not grasp the handle with your hand, unless there is no other way to open the door. Especially important on bathroom and post office/commercial doors. 4) Use disinfectant wipes at the stores when they are available, including wiping the handle and child seat in grocery carts. 5) Wash your hands with soap for 10-20 seconds and/or use a greater than 60% alcohol-based hand  whenever you return home from ANY activity that involves locations where other people have been. 6) Keep a bottle of  available at each of your home's entrances. AND in your car for use after getting gas or touching other contaminated objects when you can't immediately wash your hands. 7) If possible, cough or sneeze into a disposable tissue and discard. Use your elbow only if you have to. The clothing on your elbow will contain infectious virus that can be passed on for up to a week or more! 8) Zinc lozenges have scientific evidence that show they are effective in blocking coronavirus (and most other viruses) from multiplying in your throat and nasopharynx. Use as directed several times each day when you begin to feel ANY \"cold-like\" symptoms beginning. It is best to lie down and let the lozenge dissolve in the back of your throat and nasopharynx. Cold-Eeze lozenges is one brand available, but there are other brands available. 3) Cholesterol can cause plaque to build up in artery walls, leading to atherosclerotic cardiovascular disease (ASCVD) which can lead to such medical issues as heart attack and stroke.  The [employer] of [employer] created a formula that evaluates the risk for future cardiac events (such as a stroke.)  It is based on age, sex, race, systolic blood pressure, cholesterol, history of smoking, and treatment of diabetes and hypertension. If the 10 year risk score is greater than 7.5%, a statin medication is recommended. Using yoour 2018 lipid labs, your ASCVD risk score states you have a 3.4% risk in the next 10 years of having a cardiac event and a 39% chance over your lifetime. Consequently, at this time a statin is not recommended at this time. Try these lifestyle strategies to lower your cholesterol:   Decrease saturated fats in diet. Saturated fats are found in:   o meats including fatty beef, pork, lamb, chicken or turkey with skin, and ground beef,   o high fat cheese,   o whole fat diary, milk, cream and ice cream,  o butter  o most saturated fats are found in animal products   Eliminate Trans Fats from your diet. Foods that contain TF include:  o Fast foods: fried chicken, biscuits, fried fish for fried fish sandwichs, [place] fries  o Donuts and muffins  o Crackers and cookies  o Cake, cake icing and pies  o Canned biscuits or refrigerated dough  o Microwave popcorn  o Coffee creamer  o Frozen pizza  o Stick margarine or vegetable shortening   Increase the amount of soluble fiber in your diet. Sources of soluble fiber include:  o oats, peas, beans, apples, citrus fruits, carrots, barley and psyllium   Include omega-3 fatty acids in your diet, these are found in:   o FISH! Try and eat 2 servings of fish a week. Good examples include salmon, albacore tuna, halibut, trout and mackerel.  o Take a fish oil supplement daily   Look for foods enriched with plant-sterols. There are many products on the market which are fortified with this nutrient including buttery spreads and yogurts. Look for the Benechol and Promise brands.  Red yeast rice - at least 1800 mg daily to help lower cholesterol. Take either 2 of the 900 or 1200 mg capsules daily or 3-4 of the 600 mg capsules all together or  throughout the day   Increase your physical activity to at least 150 minutes a week.  This is just 5 sessions of thirty minutes a week!  Losing weight can significantly lower your cholesterol.  If you are a smoker, QUIT SMOKING, this can significantly lower your cholesterol and overall cardiovascular risk. It also can help to decrease your risk for many other conditions. 5) Blood pressure may tend to run too low, causing lightheaded and dizziness   Please check your blood pressure through the week occasionally. Sit  with your feet uncrossed and relax for 5 minutes before taking your BP. Keep a written record of your blood pressure readings and bring it to each appointment. If your systolic (top) blood pressure is consistently greater than 140mmHg or less than [id number] of the diastolic (bottom) number is consistently greater than 90mmHg or less than 60mmHg then please schedule an office appointment. Cardiac symptoms that would need immediate attention include: uncomfortable pressure, squeezing, fullness or pain in the center of your chest. Pain or discomfort in one or both arms, the back, neck, jaw or stomach. Shortness of breath with or without chest discomfort, breaking out in a cold sweat, nausea or lightheadedness. Clostridium Difficile Colitis: Care Instructions  Your Care Instructions    Clostridium difficile (also called C. difficile) are bacteria that can cause swelling and irritation of the large intestine, or colon. This inflammation is also called colitis. It can cause diarrhea, fever, and belly cramps. You may get C. difficile colitis if you take antibiotics. The infection is most common in people who are taking antibiotics while in the hospital. It is also common in older people in hospitals and nursing homes. Severe disease could cause the colon to swell to many times its normal size (toxic megacolon). This can cause death and needs emergency treatment. You may have a swollen belly that is painful or tender, a rapid heartbeat, and a fever.   Follow-up care is a key part of your treatment and safety. Be sure to make and go to all appointments, and call your doctor if you are having problems. It's also a good idea to know your test results and keep a list of the medicines you take. How can you care for yourself at home? · Your doctor may give you antibiotics to treat C. difficile colitis. If your doctor prescribes an antibiotic, he or she will give you a different antibiotic than the one that caused your infection. Take your antibiotics as directed. Do not stop taking them just because you feel better. You need to take the full course of antibiotics. · To prevent dehydration, drink plenty of fluids, enough so that your urine is light yellow or clear like water. Choose water and other caffeine-free clear liquids until you feel better. If you have kidney, heart, or liver disease and have to limit fluids, talk with your doctor before you increase the amount of fluids you drink. · Begin eating small amounts of mild foods, if you feel like it. Try yogurt that has live cultures of lactobacillus (check the label). ? Avoid spicy foods, fruits, alcohol, and caffeine until 48 hours after all symptoms go away. ? Avoid chewing gum that contains sorbitol. ? Avoid dairy products (except for yogurt with lactobacillus) while you have diarrhea and for 3 days after symptoms go away. · To prevent the spread of C. difficile, practice good hygiene. Keep your hands clean by washing them well and often with soap and clean, running water. This is most important after you use the bathroom and before you make and eat food. Remind everyone in your home to also wash their hands often. Alcohol-based hand sanitizers do not kill C. difficile. · After the diarrhea is better, you are much less likely to spread C. difficile. But you still need to take extra care to keep your home clean. ? Clean bathroom surfaces with a bleach solution to kill any C. difficile spores.  To dilute household bleach, follow the directions on the label. When should you call for help? Call 911 if:    · You passed out (lost consciousness).    Call your doctor now or seek immediate medical care if:    · You have a fever over 101°F or shaking chills.     · You feel lightheaded or have a fast heart rate.     · You pass stools that are almost always bloody.     · You have signs of needing more fluids. You have sunken eyes and a dry mouth, and you pass only a little dark urine.     · You have severe belly pain with or without bloating.     · You have severe vomiting and cannot keep down liquids.     · You are not passing any stools or gas.    Watch closely for changes in your health, and be sure to contact your doctor if:    · You do not get better as expected. Where can you learn more? Go to http://phylicia-maryana.info/  Enter H511 in the search box to learn more about \"Clostridium Difficile Colitis: Care Instructions. \"  Current as of: January 26, 2020Content Version: 12.4  © 0208-4099 [employer]. Care instructions adapted under license by [employer] (which disclaims liability or warranty for this information). If you have questions about a medical condition or this instruction, always ask your healthcare professional. [address] any warranty or liability for your use of this information.

## 2020-03-17 NOTE — PROGRESS NOTES
Isela Krishnan is a 49 y.o. male  Chief Complaint   [facility] presents with   [facility]   [facility] Follow Up     [address] 3/10-3/13 for cholitis.   Also need to have 6 staples removed from back of head     Visit Vitals  /78 (BP 1 Location: Left arm, BP Patient Position: Sitting)   Pulse 62   Temp 97.6 °F (36.4 °C) (Oral)   Resp 22   Ht 6' (1.829 m)   Wt 165 lb 1.6 oz (74.9 kg)   SpO2 100%   BMI 22.39 kg/m²

## 2020-03-17 NOTE — PROGRESS NOTES
Date of visit: 3/17/2020   Subjective:      History obtained from:  the patient. Brady Mahoney is a 49 y.o. male who presents today for face-to face transition of care. Assessment/Plan:  1. Transition of care performed with sharing of clinical summary  -ED 3/11/20 - Darwyn Kapadia off toilet and hit head - had laceration closed w/staples   -hospitalized 3/11 -3/13/20 for Severe C diff colitis - causing hypotension due to GI fluid loss as a result of diarrhea; PILLO     2. Laceration of scalp, initial encounter  -removed staples  -laceration well approximated, no s/s of infection    3.  Abnormal Labs  -Hgb = 11.8, albumin = 2.6  -pt to work with nutritionist and then rtc for CPE and labs to recheck values     RTC 3 months for CPE/labs    Transition of Care documentation    Date of discharge:  3/13/20 (admitted:  3/10/20)    Hospital Course:    Severe C diff colitis - causing hypotension due to GI fluid loss as a result of diarrhea; PILLO   Fell off toilet and hit head - had laceration closed w/staples   Home health instituted no    Concerns for today:   Needs staples removed  Feels 85% normal now -   Has been eating regular diet, no diarrhea  Making sure he is well hydrated  BM yesterday small     Had CAC = 0 (a few years ago)   LDL = 153 (11/2018)  ASCVD risk = 3.4% (based on 2018 XOL)    PSA = 6.4% (2018) - has gone to Urology - had benign biopsy, f/u [facility] urology    Social history:   Nutrition: working with nutritionist   Physical: runs 15miles/week, owns yoga studio   Social: lives with wife, 2 daughters, (25, 19yo) [address] [employer]   Occupation: computer desk     Social History     Tobacco Use   Smoking Status Never Smoker   Smokeless Tobacco Never Used     Social History     Substance and Sexual Activity   Alcohol Use Yes    Alcohol/week: 1.7 - 2.5 standard drinks    Types: 2 - 3 Standard drinks or equivalent per week     Social History     Substance and Sexual Activity   Drug Use No       3 most recent PHQ Screens 3/17/2020   Little interest or pleasure in doing things Not at all   Feeling down, depressed, irritable, or hopeless Not at all   Total Score PHQ 2 0        I reviewed the following:   Patient Active Problem List    Diagnosis Date Noted    Colitis 03/11/2020    Syncope 03/11/2020    Hypotension 03/11/2020    Scalp laceration 03/11/2020    PILLO (acute kidney injury) ([facility] [address] 03/11/2020    Elevated PSA, less than 10 ng/ml 08/15/2018    TABATHA (obstructive sleep apnea) 01/05/2015     Current Outpatient Medications   Medication Sig Dispense Refill    vancomycin (FIRVANQ) 50 mg/mL oral solution Take 2.5 mL by mouth every six (6) hours for 10 days. 100 mL 0    diphenoxylate-atropine (LOMOTIL) 2.5-0.025 mg per tablet Take 1 Tab by mouth four (4) times daily as needed for Diarrhea (1 tab after each stool for max 8 per day). Max Daily Amount: 4 Tabs. Take after each stool for a maximum of 8 tablets daily 20 Tab 0    ondansetron (ZOFRAN ODT) 4 mg disintegrating tablet Take 1 Tab by mouth every eight (8) hours as needed for Nausea. 10 Tab 0    cetirizine (ZYRTEC) 10 mg tablet Take 10 mg by mouth daily as needed. No Known Allergies  Past Medical History:   Diagnosis Date    Asthma     as child    Contracture of hand joint     Dupytens Contracture of both hands    Sun-damaged skin      Past Surgical History:   Procedure Laterality Date    HX OTHER SURGICAL      bilateral Dupytren contracture repair    HX VASECTOMY       Family History   Problem Relation Age of Onset    Cancer Father         lung    Heart Disease Paternal Grandfather     Elevated Lipids Mother     Diabetes Maternal Grandmother     Diabetes Maternal Grandfather     Alzheimer Maternal Grandfather     Cancer Daughter         neuroblastoma     Social History     Tobacco Use    Smoking status: Never Smoker    Smokeless tobacco: Never Used   Substance Use Topics    Alcohol use:  Yes     Alcohol/week: 1.7 - 2.5 standard drinks     Types: 2 - 3 Standard drinks or equivalent per week      Social History     Social History Narrative    Not on file        Review of Systems  - Constitutional Symptoms: no fevers, chills, weight loss  - Cardiovascular: no chest pain or palpitations  - Respiratory: no cough or shortness of breath  - Gastrointestinal: no dysphagia or abdominal pain; no issues with bowel movements  - Musculoskeletal: no joint pains or weakness; no edema in extremities  - Integumentary: no rashes, PU's  - : no issues urination   ROS is negative otherwise. 3 most recent PHQ Screens 3/17/2020   Little interest or pleasure in doing things Not at all   Feeling down, depressed, irritable, or hopeless Not at all   Total Score PHQ 2 0        Objective:     Vitals:    03/17/20 0954   BP: 111/78   Pulse: 62   Resp: 22   Temp: 97.6 °F (36.4 °C)   TempSrc: Oral   SpO2: 100%   Weight: 165 lb 1.6 oz (74.9 kg)   Height: 6' (1.829 m)       GENERAL: Hao A Lower is in no acute distress. Non-toxic. Well nourished. Well developed. Appropriately groomed. HEAD:  Normocephalic. Atraumatic. Non tender sinuses x 4. RESP: Breath sounds are symmetrical bilaterally. Unlabored without SOB. Speaking in full sentences. Clear to auscultation bilaterally anteriorly and posteriorly. No wheezes. No rales or rhonchi. CV: normal rate. Regular rhythm. S1, S2 audible. No murmur noted. No rubs, clicks or gallops noted. ABDOMEN: Flat without bulges or pulsations. Soft and nondistended. No tenderness on palpation. No masses or organomegaly. No rebound, rigidity or guarding. Bowel sounds normal x 4 quadrants. NEURO:  awake, alert and oriented to person, place, and time and event. Clear speech. Muscle strength is +5/5 x 4 extremities. Sensation is intact to light touch bilaterally. Steady gait. HEME/LYMPH: peripheral pulses palpable 2+ x 4 extremities. No peripheral edema is noted. No cervical adenopathy noted.    SKIN: Skin is warm and dry. Turgor is normal. No petechiae, no purpura, no rash. No cyanosis. No mottling, jaundice or pallor. PSYCH: appropriate behavior, dress and thought processes. Good eye contact. Clear and coherent speech. Full affect. Good insight. Lab Results   Component Value Date/Time    Cholesterol, total 236 (H) 08/13/2018 08:36 AM    HDL Cholesterol 66 08/13/2018 08:36 AM    LDL, calculated 153 (H) 08/13/2018 08:36 AM    VLDL, calculated 17 08/13/2018 08:36 AM    Triglyceride 87 08/13/2018 08:36 AM     Lab Results   Component Value Date/Time    Sodium 142 03/13/2020 04:07 AM    Potassium 3.7 03/13/2020 04:07 AM    Chloride 113 (H) 03/13/2020 04:07 AM    CO2 21 03/13/2020 04:07 AM    Anion gap 8 03/13/2020 04:07 AM    Glucose 80 03/13/2020 04:07 AM    BUN 5 (L) 03/13/2020 04:07 AM    Creatinine 0.80 03/13/2020 04:07 AM    BUN/Creatinine ratio 6 (L) 03/13/2020 04:07 AM    GFR est AA >60 03/13/2020 04:07 AM    GFR est non-AA >60 03/13/2020 04:07 AM    Calcium 7.3 (L) 03/13/2020 04:07 AM    Bilirubin, total 0.9 03/13/2020 04:07 AM    AST (SGOT) 17 03/13/2020 04:07 AM    Alk. phosphatase 42 (L) 03/13/2020 04:07 AM    Protein, total 5.4 (L) 03/13/2020 04:07 AM    Albumin 2.6 (L) 03/13/2020 04:07 AM    Globulin 2.8 03/13/2020 04:07 AM    A-G Ratio 0.9 (L) 03/13/2020 04:07 AM    ALT (SGPT) 21 03/13/2020 04:07 AM     Lab Results   Component Value Date/Time    WBC 8.5 03/13/2020 04:07 AM    HGB 11.8 (L) 03/13/2020 04:07 AM    HCT 34.1 (L) 03/13/2020 04:07 AM    PLATELET 181 (L) 72/54/3288 04:07 AM    MCV 94.2 03/13/2020 04:07 AM     _____________________________________________________________________  I have discussed the diagnosis with the patient and the intended plan as seen in the above orders. The patient has received an after-visit summary and questions were answered concerning future plans. I have discussed medication side effects and warnings with the patient as well.  Informed pt to return to the office if new symptoms arise. Urgent consultation with the nearest Emergency Department is strongly recommended if condition worsens.

## 2020-05-04 ENCOUNTER — VIRTUAL VISIT (OUTPATIENT)
Dept: INTERNAL MEDICINE CLINIC | Age: 55
End: 2020-05-04

## 2020-05-04 DIAGNOSIS — R97.20 ELEVATED PSA, LESS THAN 10 NG/ML: Primary | ICD-10-CM

## 2020-05-04 DIAGNOSIS — G47.33 OSA (OBSTRUCTIVE SLEEP APNEA): ICD-10-CM

## 2020-05-04 DIAGNOSIS — H35.60 RETINAL HEMORRHAGE, UNSPECIFIED LATERALITY: ICD-10-CM

## 2020-05-04 DIAGNOSIS — E78.2 MIXED HYPERLIPIDEMIA: ICD-10-CM

## 2020-05-04 NOTE — PROGRESS NOTES
Makenna Nair is a 49 y.o. male who was seen by synchronous (real-time) audio-video technology on 5/4/2020. Consent: Hao Hu, who was seen by synchronous (real-time) audio-video technology, and/or his healthcare decision maker, is aware that this patient-initiated, Telehealth encounter on 5/4/2020 is a billable service, with coverage as determined by his insurance carrier. He is aware that he may receive a bill and has provided verbal consent to proceed: Yes. Assessment & Plan:   Diagnoses and all orders for this visit:    1. Elevated PSA, less than 10 ng/ml    2. TABATHA (obstructive sleep apnea)          I spent at least 40 minutes on this visit with this established patient. [phone]  Subjective:   Makenna Nair is a 49 y.o. male who was seen for Establish Care    He used to follow with dr Concepcion Krishnamurthy, last saw him aug 2018. He is doing well now, though had a bout of c diff colitis in march 2020 after being on abx for prostate bx. He has some concerns about his cholesterol, but otherwise is doing well. He remains very active, runs regularly, and eats healthily. This would be his annual cpe, but due to covid 19, it is a virtual visit, so will be billed as a regular follow up visit. Prior to Admission medications    Not on File     No Known Allergies        ROS      Objective: There were no vitals taken for this visit. General: alert, cooperative, no distress   Mental  status: normal mood, behavior, speech, dress, motor activity, and thought processes, able to follow commands   HENT: NCAT   Neck: no visualized mass   Resp: no respiratory distress   Neuro: no gross deficits   Skin: no discoloration or lesions of concern on visible areas   Psychiatric: normal affect, consistent with stated mood, no evidence of hallucinations     Additional exam findings:     1. Elevated psa--had bx and mri done,all negative.   Follows with urology  2.  hyperlipids--last , check flp  3.  tabatha--unclear if he ever underwent any testing. Will discuss at next visit. 4.  Hx retinal hemorrhage--follows with ophtho. Check cbc, cmp, tsh, a1c    rtc one year, sooner if labs abn. We discussed the expected course, resolution and complications of the diagnosis(es) in detail. Medication risks, benefits, costs, interactions, and alternatives were discussed as indicated. I advised him to contact the office if his condition worsens, changes or fails to improve as anticipated. He expressed understanding with the diagnosis(es) and plan. Olimpia Delarosa is a 49 y.o. male who was evaluated by a video visit encounter for concerns as above. Patient identification was verified prior to start of the visit. A caregiver was present when appropriate. Due to this being a TeleHealth encounter (During [facility]- public health emergency), evaluation of the following organ systems was limited: Vitals/Constitutional/EENT/Resp/CV/GI//MS/Neuro/Skin/Heme-Lymph-Imm. Pursuant to the emergency declaration under the [facility], 1135 waiver authority and the [employer] Act, this Virtual  Visit was conducted, with patient's (and/or legal guardian's) consent, to reduce the patient's risk of exposure to COVID-19 and provide necessary medical care. Services were provided through a video synchronous discussion virtually to substitute for in-person clinic visit. Patient and provider were located at their individual homes.       Wynema Galeazzi III, DO

## 2020-05-04 NOTE — PATIENT INSTRUCTIONS
Get fasting labs done at your convenience. Nothing to eat after MN, but drink a few glasses of water.

## 2020-06-12 LAB
ALBUMIN SERPL-MCNC: 5.1 G/DL (ref 3.8–4.9)
ALBUMIN/GLOB SERPL: 2.7 {RATIO} (ref 1.2–2.2)
ALP SERPL-CCNC: 40 IU/L (ref 39–117)
ALT SERPL-CCNC: 20 IU/L (ref 0–44)
AST SERPL-CCNC: 23 IU/L (ref 0–40)
BASOPHILS # BLD AUTO: 0.1 X10E3/UL (ref 0–0.2)
BASOPHILS NFR BLD AUTO: 1 %
BILIRUB SERPL-MCNC: 1.2 MG/DL (ref 0–1.2)
BUN SERPL-MCNC: 12 MG/DL (ref 6–24)
BUN/CREAT SERPL: 11 (ref 9–20)
CALCIUM SERPL-MCNC: 9.4 MG/DL (ref 8.7–10.2)
CHLORIDE SERPL-SCNC: 102 MMOL/L (ref 96–106)
CHOLEST SERPL-MCNC: 243 MG/DL (ref 100–199)
CO2 SERPL-SCNC: 24 MMOL/L (ref 20–29)
CREAT SERPL-MCNC: 1.13 MG/DL (ref 0.76–1.27)
EOSINOPHIL # BLD AUTO: 0.2 X10E3/UL (ref 0–0.4)
EOSINOPHIL NFR BLD AUTO: 4 %
ERYTHROCYTE [DISTWIDTH] IN BLOOD BY AUTOMATED COUNT: 13.1 % (ref 11.6–15.4)
EST. AVERAGE GLUCOSE BLD GHB EST-MCNC: 94 MG/DL
GLOBULIN SER CALC-MCNC: 1.9 G/DL (ref 1.5–4.5)
GLUCOSE SERPL-MCNC: 101 MG/DL (ref 65–99)
HBA1C MFR BLD: 4.9 % (ref 4.8–5.6)
HCT VFR BLD AUTO: 48.9 % (ref 37.5–51)
HDLC SERPL-MCNC: 58 MG/DL
HGB BLD-MCNC: 16.8 G/DL (ref 13–17.7)
IMM GRANULOCYTES # BLD AUTO: 0 X10E3/UL (ref 0–0.1)
IMM GRANULOCYTES NFR BLD AUTO: 0 %
LDLC SERPL CALC-MCNC: 163 MG/DL (ref 0–99)
LYMPHOCYTES # BLD AUTO: 1.5 X10E3/UL (ref 0.7–3.1)
LYMPHOCYTES NFR BLD AUTO: 34 %
MCH RBC QN AUTO: 32.6 PG (ref 26.6–33)
MCHC RBC AUTO-ENTMCNC: 34.4 G/DL (ref 31.5–35.7)
MCV RBC AUTO: 95 FL (ref 79–97)
MONOCYTES # BLD AUTO: 0.4 X10E3/UL (ref 0.1–0.9)
MONOCYTES NFR BLD AUTO: 9 %
NEUTROPHILS # BLD AUTO: 2.2 X10E3/UL (ref 1.4–7)
NEUTROPHILS NFR BLD AUTO: 52 %
PLATELET # BLD AUTO: 220 X10E3/UL (ref 150–450)
POTASSIUM SERPL-SCNC: 4.7 MMOL/L (ref 3.5–5.2)
PROT SERPL-MCNC: 7 G/DL (ref 6–8.5)
RBC # BLD AUTO: 5.16 X10E6/UL (ref 4.14–5.8)
SODIUM SERPL-SCNC: 141 MMOL/L (ref 134–144)
TRIGL SERPL-MCNC: 111 MG/DL (ref 0–149)
TSH SERPL DL<=0.005 MIU/L-ACNC: 2.87 UIU/ML (ref 0.45–4.5)
VLDLC SERPL CALC-MCNC: 22 MG/DL (ref 5–40)
WBC # BLD AUTO: 4.3 X10E3/UL (ref 3.4–10.8)

## 2020-06-12 NOTE — PROGRESS NOTES
Overall labs look good, though cholesterol levels are bit elevated, . Goal is less than 100. Typically don't start meds in pts without dm or cad until level reaches 190.

## 2021-07-22 ENCOUNTER — HOSPITAL ENCOUNTER (OUTPATIENT)
Dept: RADIATION THERAPY | Age: 56
Discharge: HOME | End: 2021-07-22

## 2021-08-06 ENCOUNTER — TRANSCRIBE ORDER (OUTPATIENT)
Dept: RADIATION THERAPY | Age: 56
End: 2021-08-06

## 2021-08-06 DIAGNOSIS — C61 PROSTATE CANCER (HCC): Primary | ICD-10-CM

## 2021-08-07 DIAGNOSIS — C61 PROSTATE CANCER (HCC): ICD-10-CM

## 2021-12-03 ENCOUNTER — HOSPITAL ENCOUNTER (OUTPATIENT)
Dept: RADIATION THERAPY | Age: 56
Discharge: HOME | End: 2021-12-03

## 2021-12-03 PROBLEM — C61 PROSTATE CANCER (HCC): Status: ACTIVE | Noted: 2021-01-01

## 2021-12-03 RX ORDER — TAMSULOSIN HYDROCHLORIDE 0.4 MG/1
0.4 CAPSULE ORAL
Qty: 90 CAPSULE | Refills: 5 | Status: SHIPPED | OUTPATIENT
Start: 2021-12-03 | End: 2022-05-06 | Stop reason: ALTCHOICE

## 2022-03-18 PROBLEM — I95.9 HYPOTENSION: Status: ACTIVE | Noted: 2020-03-11

## 2022-03-18 PROBLEM — R55 SYNCOPE: Status: ACTIVE | Noted: 2020-03-11

## 2022-03-18 PROBLEM — E78.49 OTHER HYPERLIPIDEMIA: Status: ACTIVE | Noted: 2020-03-17

## 2022-03-18 PROBLEM — C61 PROSTATE CANCER (HCC): Status: ACTIVE | Noted: 2021-01-01

## 2022-03-19 PROBLEM — R97.20 ELEVATED PSA, LESS THAN 10 NG/ML: Status: ACTIVE | Noted: 2018-08-15

## 2022-03-19 PROBLEM — N17.9 AKI (ACUTE KIDNEY INJURY) (HCC): Status: ACTIVE | Noted: 2020-03-11

## 2022-03-20 PROBLEM — K52.9 COLITIS: Status: ACTIVE | Noted: 2020-03-11

## 2022-03-20 PROBLEM — S01.01XA SCALP LACERATION: Status: ACTIVE | Noted: 2020-03-11

## 2022-05-06 ENCOUNTER — HOSPITAL ENCOUNTER (OUTPATIENT)
Dept: RADIATION THERAPY | Age: 57
Discharge: HOME | End: 2022-05-06

## 2022-05-06 RX ORDER — TADALAFIL 5 MG/1
10 TABLET ORAL DAILY
Qty: 90 TABLET | Refills: 4 | Status: SHIPPED | OUTPATIENT
Start: 2022-05-06 | End: 2022-06-17 | Stop reason: SDUPTHER

## 2022-06-17 RX ORDER — TADALAFIL 5 MG/1
10 TABLET ORAL DAILY
Qty: 90 TABLET | Refills: 4 | Status: SHIPPED | OUTPATIENT
Start: 2022-06-17

## 2022-11-07 ENCOUNTER — HOSPITAL ENCOUNTER (OUTPATIENT)
Dept: RADIATION THERAPY | Age: 57
Discharge: HOME | End: 2022-11-07

## 2022-12-14 PROBLEM — R97.20 ELEVATED PSA, LESS THAN 10 NG/ML: Status: RESOLVED | Noted: 2018-08-15 | Resolved: 2022-12-14

## 2022-12-14 PROBLEM — R55 SYNCOPE: Status: RESOLVED | Noted: 2020-03-11 | Resolved: 2022-12-14

## 2022-12-14 PROBLEM — I95.9 HYPOTENSION: Status: RESOLVED | Noted: 2020-03-11 | Resolved: 2022-12-14

## 2022-12-14 PROBLEM — N17.9 AKI (ACUTE KIDNEY INJURY) (HCC): Status: RESOLVED | Noted: 2020-03-11 | Resolved: 2022-12-14

## 2022-12-14 PROBLEM — S01.01XA SCALP LACERATION: Status: RESOLVED | Noted: 2020-03-11 | Resolved: 2022-12-14

## 2022-12-14 PROBLEM — K52.9 COLITIS: Status: RESOLVED | Noted: 2020-03-11 | Resolved: 2022-12-14

## 2023-03-23 RX ORDER — TADALAFIL 5 MG/1
10 TABLET ORAL
Qty: 90 TABLET | Refills: 5 | Status: SHIPPED | OUTPATIENT
Start: 2023-03-23

## 2023-05-21 RX ORDER — CETIRIZINE HYDROCHLORIDE 10 MG/1
CAPSULE, LIQUID FILLED ORAL DAILY PRN
COMMUNITY

## 2023-05-21 RX ORDER — TADALAFIL 5 MG/1
10 TABLET ORAL DAILY PRN
COMMUNITY
Start: 2023-03-23

## 2023-05-21 RX ORDER — ROSUVASTATIN CALCIUM 10 MG/1
10 TABLET, COATED ORAL DAILY
COMMUNITY
Start: 2023-03-21